# Patient Record
Sex: FEMALE | Race: WHITE | NOT HISPANIC OR LATINO | Employment: OTHER | URBAN - METROPOLITAN AREA
[De-identification: names, ages, dates, MRNs, and addresses within clinical notes are randomized per-mention and may not be internally consistent; named-entity substitution may affect disease eponyms.]

---

## 2019-04-01 ENCOUNTER — APPOINTMENT (OUTPATIENT)
Dept: RADIOLOGY | Facility: MEDICAL CENTER | Age: 64
End: 2019-04-01
Attending: EMERGENCY MEDICINE
Payer: COMMERCIAL

## 2019-04-01 ENCOUNTER — HOSPITAL ENCOUNTER (EMERGENCY)
Facility: MEDICAL CENTER | Age: 64
End: 2019-04-01
Attending: EMERGENCY MEDICINE
Payer: COMMERCIAL

## 2019-04-01 VITALS
OXYGEN SATURATION: 98 % | WEIGHT: 181.88 LBS | TEMPERATURE: 97.9 F | DIASTOLIC BLOOD PRESSURE: 99 MMHG | RESPIRATION RATE: 18 BRPM | SYSTOLIC BLOOD PRESSURE: 164 MMHG | HEART RATE: 73 BPM

## 2019-04-01 DIAGNOSIS — I82.4Z1 ACUTE DEEP VEIN THROMBOSIS (DVT) OF DISTAL VEIN OF RIGHT LOWER EXTREMITY (HCC): ICD-10-CM

## 2019-04-01 LAB
ALBUMIN SERPL BCP-MCNC: 3.6 G/DL (ref 3.2–4.9)
ALBUMIN/GLOB SERPL: 1.2 G/DL
ALP SERPL-CCNC: 73 U/L (ref 30–99)
ALT SERPL-CCNC: 31 U/L (ref 2–50)
ANION GAP SERPL CALC-SCNC: 11 MMOL/L (ref 0–11.9)
APTT PPP: 30.8 SEC (ref 24.7–36)
AST SERPL-CCNC: 26 U/L (ref 12–45)
BASOPHILS # BLD AUTO: 0.6 % (ref 0–1.8)
BASOPHILS # BLD: 0.04 K/UL (ref 0–0.12)
BILIRUB SERPL-MCNC: 0.4 MG/DL (ref 0.1–1.5)
BUN SERPL-MCNC: 12 MG/DL (ref 8–22)
CALCIUM SERPL-MCNC: 9.7 MG/DL (ref 8.4–10.2)
CHLORIDE SERPL-SCNC: 106 MMOL/L (ref 96–112)
CO2 SERPL-SCNC: 21 MMOL/L (ref 20–33)
CREAT SERPL-MCNC: 0.86 MG/DL (ref 0.5–1.4)
EOSINOPHIL # BLD AUTO: 0.13 K/UL (ref 0–0.51)
EOSINOPHIL NFR BLD: 1.9 % (ref 0–6.9)
ERYTHROCYTE [DISTWIDTH] IN BLOOD BY AUTOMATED COUNT: 43.4 FL (ref 35.9–50)
GLOBULIN SER CALC-MCNC: 2.9 G/DL (ref 1.9–3.5)
GLUCOSE SERPL-MCNC: 107 MG/DL (ref 65–99)
HCT VFR BLD AUTO: 41.2 % (ref 37–47)
HGB BLD-MCNC: 13.9 G/DL (ref 12–16)
IMM GRANULOCYTES # BLD AUTO: 0.03 K/UL (ref 0–0.11)
IMM GRANULOCYTES NFR BLD AUTO: 0.4 % (ref 0–0.9)
INR PPP: 1.01 (ref 0.87–1.13)
LYMPHOCYTES # BLD AUTO: 2.17 K/UL (ref 1–4.8)
LYMPHOCYTES NFR BLD: 31.5 % (ref 22–41)
MCH RBC QN AUTO: 32.5 PG (ref 27–33)
MCHC RBC AUTO-ENTMCNC: 33.7 G/DL (ref 33.6–35)
MCV RBC AUTO: 96.3 FL (ref 81.4–97.8)
MONOCYTES # BLD AUTO: 0.48 K/UL (ref 0–0.85)
MONOCYTES NFR BLD AUTO: 7 % (ref 0–13.4)
NEUTROPHILS # BLD AUTO: 4.04 K/UL (ref 2–7.15)
NEUTROPHILS NFR BLD: 58.6 % (ref 44–72)
NRBC # BLD AUTO: 0 K/UL
NRBC BLD-RTO: 0 /100 WBC
PLATELET # BLD AUTO: 188 K/UL (ref 164–446)
PMV BLD AUTO: 11 FL (ref 9–12.9)
POTASSIUM SERPL-SCNC: 3.6 MMOL/L (ref 3.6–5.5)
PROT SERPL-MCNC: 6.5 G/DL (ref 6–8.2)
PROTHROMBIN TIME: 13.2 SEC (ref 12–14.6)
RBC # BLD AUTO: 4.28 M/UL (ref 4.2–5.4)
SODIUM SERPL-SCNC: 138 MMOL/L (ref 135–145)
WBC # BLD AUTO: 6.9 K/UL (ref 4.8–10.8)

## 2019-04-01 PROCEDURE — 85610 PROTHROMBIN TIME: CPT

## 2019-04-01 PROCEDURE — A9270 NON-COVERED ITEM OR SERVICE: HCPCS | Performed by: EMERGENCY MEDICINE

## 2019-04-01 PROCEDURE — 700102 HCHG RX REV CODE 250 W/ 637 OVERRIDE(OP): Performed by: EMERGENCY MEDICINE

## 2019-04-01 PROCEDURE — 85730 THROMBOPLASTIN TIME PARTIAL: CPT

## 2019-04-01 PROCEDURE — 93971 EXTREMITY STUDY: CPT | Mod: RT

## 2019-04-01 PROCEDURE — 85025 COMPLETE CBC W/AUTO DIFF WBC: CPT

## 2019-04-01 PROCEDURE — 99284 EMERGENCY DEPT VISIT MOD MDM: CPT

## 2019-04-01 PROCEDURE — 80053 COMPREHEN METABOLIC PANEL: CPT

## 2019-04-01 RX ADMIN — RIVAROXABAN 15 MG: 15 TABLET, FILM COATED ORAL at 17:28

## 2019-04-01 ASSESSMENT — PAIN SCALES - WONG BAKER: WONGBAKER_NUMERICALRESPONSE: HURTS JUST A LITTLE BIT

## 2019-04-01 NOTE — ED PROVIDER NOTES
ED Provider Note    CHIEF COMPLAINT  Chief Complaint   Patient presents with   • Leg Pain     pain to R calf area. started last night       HPI  Ary Matta is a 64 y.o. female who presents for evaluation of right-sided atraumatic calf pain, woke her from sleep acutely early last night she states the pain is been pretty persistent and at times severe since.  No weakness or numbness or tingling, no fever.  Denies a history of DVT or PE, she denies any sort of hormone replacement, no recent immobilizations or surgeries, no known cancer.  The patient has a family history of PE but states this cases were secondary to trauma.  She otherwise states she is healthy really has no medical problems and has no other complaints at this time.    REVIEW OF SYSTEMS  Negative for fever, rash, chest pain, dyspnea.    PAST MEDICAL HISTORY       SOCIAL HISTORY  Social History     Social History Main Topics   • Smoking status: Never Smoker   • Smokeless tobacco: Never Used   • Alcohol use Yes      Comment: wine daily   • Drug use: No   • Sexual activity: Not on file       SURGICAL HISTORY  patient denies any surgical history    CURRENT MEDICATIONS  I personally reviewed the medication list in the charting documentation.     ALLERGIES  Allergies   Allergen Reactions   • Macrodantin [Kdc:Red Dye+Yellow Dye+Ci Pigment Blue 63+Nitrofurantoin] Shortness of Breath       PHYSICAL EXAM  VITAL SIGNS: BP (!) 199/99   Pulse 87   Temp 37 °C (98.6 °F) (Temporal)   Resp 18   Wt 82.5 kg (181 lb 14.1 oz)   SpO2 96%   Constitutional: Alert in no apparent distress.  HENT: No signs of trauma.   Eyes: Conjunctiva normal, Non-icteric.   Chest: Normal nonlabored respirations  Skin: No erythema, No rash.   Musculoskeletal: Inspection of unremarkable inspection of the right calf, no obvious asymmetric edema, no erythema, neurovascularly intact distally, she does have some tenderness on palpation of the calf posteriorly.  Normal sensation  distally.  Neurologic: Alert, No focal deficits noted.   Psychiatric: Affect normal, Judgment normal.    DIAGNOSTIC STUDIES / PROCEDURES    Results for orders placed or performed during the hospital encounter of 04/01/19   CBC WITH DIFFERENTIAL   Result Value Ref Range    WBC 6.9 4.8 - 10.8 K/uL    RBC 4.28 4.20 - 5.40 M/uL    Hemoglobin 13.9 12.0 - 16.0 g/dL    Hematocrit 41.2 37.0 - 47.0 %    MCV 96.3 81.4 - 97.8 fL    MCH 32.5 27.0 - 33.0 pg    MCHC 33.7 33.6 - 35.0 g/dL    RDW 43.4 35.9 - 50.0 fL    Platelet Count 188 164 - 446 K/uL    MPV 11.0 9.0 - 12.9 fL    Neutrophils-Polys 58.60 44.00 - 72.00 %    Lymphocytes 31.50 22.00 - 41.00 %    Monocytes 7.00 0.00 - 13.40 %    Eosinophils 1.90 0.00 - 6.90 %    Basophils 0.60 0.00 - 1.80 %    Immature Granulocytes 0.40 0.00 - 0.90 %    Nucleated RBC 0.00 /100 WBC    Neutrophils (Absolute) 4.04 2.00 - 7.15 K/uL    Lymphs (Absolute) 2.17 1.00 - 4.80 K/uL    Monos (Absolute) 0.48 0.00 - 0.85 K/uL    Eos (Absolute) 0.13 0.00 - 0.51 K/uL    Baso (Absolute) 0.04 0.00 - 0.12 K/uL    Immature Granulocytes (abs) 0.03 0.00 - 0.11 K/uL    NRBC (Absolute) 0.00 K/uL   COMP METABOLIC PANEL   Result Value Ref Range    Sodium 138 135 - 145 mmol/L    Potassium 3.6 3.6 - 5.5 mmol/L    Chloride 106 96 - 112 mmol/L    Co2 21 20 - 33 mmol/L    Anion Gap 11.0 0.0 - 11.9    Glucose 107 (H) 65 - 99 mg/dL    Bun 12 8 - 22 mg/dL    Creatinine 0.86 0.50 - 1.40 mg/dL    Calcium 9.7 8.4 - 10.2 mg/dL    AST(SGOT) 26 12 - 45 U/L    ALT(SGPT) 31 2 - 50 U/L    Alkaline Phosphatase 73 30 - 99 U/L    Total Bilirubin 0.4 0.1 - 1.5 mg/dL    Albumin 3.6 3.2 - 4.9 g/dL    Total Protein 6.5 6.0 - 8.2 g/dL    Globulin 2.9 1.9 - 3.5 g/dL    A-G Ratio 1.2 g/dL   APTT   Result Value Ref Range    APTT 30.8 24.7 - 36.0 sec   PROTHROMBIN TIME   Result Value Ref Range    PT 13.2 12.0 - 14.6 sec    INR 1.01 0.87 - 1.13   ESTIMATED GFR   Result Value Ref Range    GFR If African American >60 >60 mL/min/1.73 m 2    GFR  If Non African American >60 >60 mL/min/1.73 m 2        RADIOLOGY     US-EXTREMITY VENOUS LOWER UNILAT RIGHT   Final Result      Positive for DVT    COURSE & MEDICAL DECISION MAKING  Pertinent Labs & Imaging studies reviewed. (See chart for details)    Encounter Summary: This is a 64 y.o. female with atraumatic right calf pain, no obvious swelling or erythema on exam but does have some tenderness, neurovascularly intact, will obtain a duplex to rule out DVT.  -------- duplex is positive, patient was started on Xarelto, she will follow with anticoagulation clinic, strict return instructions have been discussed      DISPOSITION: Discharge Home      FINAL IMPRESSION  1. Acute deep vein thrombosis (DVT) of distal vein of right lower extremity (HCC)        This dictation was created using voice recognition software. The accuracy of the dictation is limited to the abilities of the software. I expect there may be some errors of grammar and possibly content. The nursing notes were reviewed and certain aspects of this information were incorporated into this note.    Electronically signed by: Bryce Vance, 4/1/2019 3:39 PM

## 2019-04-01 NOTE — ED TRIAGE NOTES
Chief Complaint   Patient presents with   • Leg Pain     pain to R calf area. started last night    Family hx of PE  BP (!) 199/99   Pulse 87   Temp 37 °C (98.6 °F) (Temporal)   Resp 18   Wt 82.5 kg (181 lb 14.1 oz)   SpO2 96%

## 2019-04-01 NOTE — ED NOTES
Med rec updated and complete  Allergies reviewed  Interviewed pt with friend at bedside with permission from pt.  Pt reports no prescription medications or vitamins.  Pt reports no antibiotics in the last 30 days.  Pt is not sure what pharmacy to go too, not sure what her insurance company will accept.

## 2019-04-02 NOTE — ED NOTES
Discharge instructions provided.  Pt verbalized the understanding of discharge instructions to follow up with PCP and to return to ER if condition worsens.  Pt ambulated out of ER without difficulty. Xarelto starter pack given with instructions.

## 2019-04-08 ENCOUNTER — ANTICOAGULATION VISIT (OUTPATIENT)
Dept: MEDICAL GROUP | Facility: MEDICAL CENTER | Age: 64
End: 2019-04-08
Payer: COMMERCIAL

## 2019-04-08 VITALS — HEART RATE: 91 BPM | DIASTOLIC BLOOD PRESSURE: 91 MMHG | SYSTOLIC BLOOD PRESSURE: 153 MMHG

## 2019-04-08 DIAGNOSIS — I82.4Z1 ACUTE DEEP VEIN THROMBOSIS (DVT) OF DISTAL VEIN OF RIGHT LOWER EXTREMITY (HCC): ICD-10-CM

## 2019-04-08 DIAGNOSIS — Z79.01 LONG TERM (CURRENT) USE OF ANTICOAGULANTS: Primary | ICD-10-CM

## 2019-04-08 PROBLEM — I82.409 DEEP VEIN THROMBOSIS (HCC): Status: ACTIVE | Noted: 2019-04-08

## 2019-04-08 PROCEDURE — 99211 OFF/OP EST MAY X REQ PHY/QHP: CPT | Performed by: FAMILY MEDICINE

## 2019-04-08 NOTE — PROGRESS NOTES
"  OP Anticoagulation Service Note    Date: 2019    Anticoaguation   Pt is  new to our clinic for diagnosis of right lower extremity DVT \"Echolucent material fills the soleal vein from mid to distal calf that has the appearance of acute, occlusive deep venous thrombosis.\"   Duration of treatment TBD   HAS-BLED = 0   Pts mother  from PE after hip injury, her great grandfather  from blood clot after car accident, she denies extended travel, denies tobacco use, denies estrogen    Bleeding Risk Assessment     Denies Epistaxis   Pt denies any excessive or unusual bleeding/hematomas.     Pt denies any GI bleeds or hematemesis.     Pt denies any concerning daily headache or sub dural hematoma symptoms.   Pt denies any hematuria or abnormal vaginal bleeding.   Latest Hemoglobin 13.9   ETOH overuse Denies     Creatinine Clearance/Renal Function     Latest ClCr >60 ml/min     Drug Interactions   ASA/other antiplatelets none   NSAID none   Other drug interactions  noen   No anticonvulsant, azoles, major inducers/inhibitors    Examination   Blood Pressure Elevated 150/80, pt reports has been since her     Symptomatic hypotension NONE   Significant gait impairment/imbalance/high risk for falls? NONE   Avoidance of estrogen containing medications   Discussed need for possible workup for occult malignancy, encouraged pt to get PCP    Final Assessment and Recommendations:   Provided pt education on NOAC. Including how to take, what to do if missed dose. Patient is aware to avoid NSAIDs and ASA (unless directed by provider). Educated pt on s/s of bleeding and thrombosis. Patient is aware to seek medical attention for severe falls or injury to their heads, to report all medication changes to our office and to notify our office of unusual bleeding or bruising.   Medication reviewed and updated  Denies hx of bleeding  Pt counseled to avoid pregnancy  Pt counseled to avoid estrogen  Pt does not use tobacco  Pt does " not have PCP, encouraged her to establish with PCP for workup for possible occult malignanc and they will make final decision on LOT,sudden.       Medication:     Xarelto 15 mg twice daily with food for 21 days then 20 mg once daily for remainder of treatment.     DO NOT STOP anticoagulation unless directed by provider or our clinic.     Follow up 1 month    Stephani Bell, Pharm D

## 2019-04-10 ENCOUNTER — TELEPHONE (OUTPATIENT)
Dept: VASCULAR LAB | Facility: MEDICAL CENTER | Age: 64
End: 2019-04-10

## 2019-04-10 NOTE — TELEPHONE ENCOUNTER
Initial anticoagulation clinic note and most recent ED note reviewed  Patient started on anticoagulation with Xarelto for below-knee DVT, appears unprovoked  She does have a family history of provoked DVT and PE  Patient currently does not have a PCP    Will continue with at least 3 months of oral anticoagulation after which time we can discuss risks and benefits of extended anticoagulation and/or any further workup  Will ask schedulers to make patient a vascular medicine appointment in early July to assess L OT Michael J. Bloch, MD  [facility]

## 2019-05-06 ENCOUNTER — ANTICOAGULATION VISIT (OUTPATIENT)
Dept: MEDICAL GROUP | Facility: MEDICAL CENTER | Age: 64
End: 2019-05-06
Payer: COMMERCIAL

## 2019-05-06 VITALS — DIASTOLIC BLOOD PRESSURE: 88 MMHG | SYSTOLIC BLOOD PRESSURE: 130 MMHG

## 2019-05-06 DIAGNOSIS — Z79.01 LONG TERM (CURRENT) USE OF ANTICOAGULANTS: Primary | ICD-10-CM

## 2019-05-06 PROCEDURE — 93793 ANTICOAG MGMT PT WARFARIN: CPT | Performed by: FAMILY MEDICINE

## 2019-05-06 RX ORDER — LISINOPRIL 20 MG/1
30 TABLET ORAL DAILY
COMMUNITY
End: 2019-07-23

## 2019-05-06 RX ORDER — CITALOPRAM HYDROBROMIDE 10 MG/1
10 TABLET ORAL DAILY
COMMUNITY
End: 2019-07-08

## 2019-05-06 NOTE — PROGRESS NOTES
OP Anticoagulation Service Note       Target end date:TBD     Indication: DVT     Drug: Xarelto 20 mg once daily         Health Status Since Last Assessment   Patient denies any new relevant medical problems, ED visits or hospitalizations   Patient denies any embolic events (stroke/tia/systemic embolism)   She established with a PCP Dr. Smith, she was started on lisinopril and celexa.     Adherence with DOAC Therapy   Pt  has 0 missed any doses in the average week   Pt understand importance of not missing doses of DOAC and increased risk of thrombosis with frequent missed doses     Bleeding Risk Assessment     Denies Epistaxis   Pt denies any excessive or unusual bleeding/hematomas.    Pt denies any GI bleeds or hematemesis.     Pt denies any concerning daily headache or sub dural hematoma symptoms.   Pt denies any hematuria or abnormal vaginal bleeding.   Latest Hemoglobin 13.9   ETOH overuse denies     Creatinine Clearance/Renal Function     Latest ClCr >60     Lab Results   Component Value Date    SODIUM 138 04/01/2019    POTASSIUM 3.6 04/01/2019    CHLORIDE 106 04/01/2019    CO2 21 04/01/2019    ANION 11.0 04/01/2019    GLUCOSE 107 (H) 04/01/2019    BUN 12 04/01/2019    CREATININE 0.86 04/01/2019    CALCIUM 9.7 04/01/2019    ASTSGOT 26 04/01/2019    ALTSGPT 31 04/01/2019    ALKPHOSPHAT 73 04/01/2019    TBILIRUBIN 0.4 04/01/2019    ALBUMIN 3.6 04/01/2019    AGRATIO 1.2 04/01/2019        Drug Interactions   Platelets: none   ASA/other antiplatelets none   NSAID none   Other drug interactions none    Verified no anticonvulsant or azole therapy, education provided for future use.     Examination    There were no vitals filed for this visit.   Symptomatic hypotension none   Significant gait impairment/imbalance/high risk for falls? none   Any falls or accidents since last visit: none     Final Assessment and Recommendations:   Patient appears stable from the anticoagulation staindpoint.    Medication is  affordable- yes with coupon card. Pt has not picked up yet. Has been using samples   Reviewed with pt major s/s of bleeding and to seek immediate medical attention  for any bad falls, accidents or if they hit his head    Benefits of continued DOAC therapy outweigh risks for this patient   Recommend pt continue with current DOAC therapy    Reminded pt not to stop anticoagulation with out speaking with a medical   Other Actions: follow up with Dr. Aldrich in July for lOT    Follow up:   Will follow up with patient in 2 month with Dr. Valdemar Bell, PharmD

## 2019-05-22 ENCOUNTER — TELEPHONE (OUTPATIENT)
Dept: VASCULAR LAB | Facility: MEDICAL CENTER | Age: 64
End: 2019-05-22

## 2019-05-30 ENCOUNTER — HOSPITAL ENCOUNTER (OUTPATIENT)
Dept: RADIOLOGY | Facility: MEDICAL CENTER | Age: 64
End: 2019-05-30
Attending: FAMILY MEDICINE
Payer: COMMERCIAL

## 2019-05-30 DIAGNOSIS — R06.02 SHORTNESS OF BREATH: ICD-10-CM

## 2019-05-30 DIAGNOSIS — R06.00 DYSPNEA, PAROXYSMAL NOCTURNAL: ICD-10-CM

## 2019-05-30 PROCEDURE — 71275 CT ANGIOGRAPHY CHEST: CPT

## 2019-05-30 PROCEDURE — 700117 HCHG RX CONTRAST REV CODE 255: Performed by: FAMILY MEDICINE

## 2019-05-30 PROCEDURE — 71046 X-RAY EXAM CHEST 2 VIEWS: CPT

## 2019-05-30 RX ADMIN — IOHEXOL 75 ML: 350 INJECTION, SOLUTION INTRAVENOUS at 10:09

## 2019-06-03 ENCOUNTER — HOSPITAL ENCOUNTER (OUTPATIENT)
Dept: RADIOLOGY | Facility: MEDICAL CENTER | Age: 64
End: 2019-06-03
Attending: FAMILY MEDICINE
Payer: COMMERCIAL

## 2019-06-03 DIAGNOSIS — Z12.31 ENCOUNTER FOR SCREENING MAMMOGRAM FOR MALIGNANT NEOPLASM OF BREAST: ICD-10-CM

## 2019-06-03 PROCEDURE — 77063 BREAST TOMOSYNTHESIS BI: CPT

## 2019-07-08 ENCOUNTER — OFFICE VISIT (OUTPATIENT)
Dept: VASCULAR LAB | Facility: MEDICAL CENTER | Age: 64
End: 2019-07-08
Attending: FAMILY MEDICINE
Payer: COMMERCIAL

## 2019-07-08 VITALS
HEART RATE: 89 BPM | BODY MASS INDEX: 31.07 KG/M2 | DIASTOLIC BLOOD PRESSURE: 97 MMHG | HEIGHT: 64 IN | WEIGHT: 182 LBS | SYSTOLIC BLOOD PRESSURE: 179 MMHG

## 2019-07-08 DIAGNOSIS — E78.5 DYSLIPIDEMIA: ICD-10-CM

## 2019-07-08 DIAGNOSIS — Z86.718 HISTORY OF DVT (DEEP VEIN THROMBOSIS): ICD-10-CM

## 2019-07-08 DIAGNOSIS — I10 ESSENTIAL HYPERTENSION: ICD-10-CM

## 2019-07-08 DIAGNOSIS — Z79.01 LONG TERM (CURRENT) USE OF ANTICOAGULANTS: ICD-10-CM

## 2019-07-08 PROCEDURE — 99212 OFFICE O/P EST SF 10 MIN: CPT | Performed by: FAMILY MEDICINE

## 2019-07-08 PROCEDURE — 99204 OFFICE O/P NEW MOD 45 MIN: CPT | Performed by: FAMILY MEDICINE

## 2019-07-08 RX ORDER — AMLODIPINE BESYLATE 5 MG/1
5 TABLET ORAL DAILY
Qty: 90 TAB | Refills: 3 | Status: SHIPPED | OUTPATIENT
Start: 2019-07-08 | End: 2019-08-09

## 2019-07-08 RX ORDER — LISINOPRIL 30 MG/1
TABLET ORAL
COMMUNITY
Start: 2019-07-05 | End: 2019-07-08

## 2019-07-08 RX ORDER — ROSUVASTATIN CALCIUM 20 MG/1
20 TABLET, COATED ORAL
Refills: 2 | COMMUNITY
Start: 2019-06-07

## 2019-07-08 ASSESSMENT — ENCOUNTER SYMPTOMS
PALPITATIONS: 0
MYALGIAS: 0
NAUSEA: 0
ABDOMINAL PAIN: 0
HEADACHES: 0
DOUBLE VISION: 0
BRUISES/BLEEDS EASILY: 0
DIARRHEA: 0
FOCAL WEAKNESS: 0
DEPRESSION: 1
ORTHOPNEA: 0
DIZZINESS: 0
WHEEZING: 0
BLURRED VISION: 0
SHORTNESS OF BREATH: 0
BLOOD IN STOOL: 0
COUGH: 0
WEAKNESS: 0
SORE THROAT: 0
CHILLS: 0
SEIZURES: 0
VOMITING: 0
TREMORS: 0
NERVOUS/ANXIOUS: 1
HEMOPTYSIS: 0
FEVER: 0
INSOMNIA: 0

## 2019-07-08 NOTE — PATIENT INSTRUCTIONS
1) stop xarelto today, start aspirin 81mg 2 pills daily   2) tylenol would preferred for pain - try tylenol 8-hour,  Reduce use of anti-inflammatories due to high risk of worsening BP   3) keep BP log   4) get renal artery ultrasound and check labs in 2 weeks in the morning around 8a    Physical activity:  - engage in moderate to vigorous physical activity such as brisk walking, swimming, cycling, >150 minutes per week at 30-40 minutes per session, 3 to 5 times weekly or as directed at today's visit     General healthly nutrition advice:  - the USDA food pattern (https://www.cnpp.usda.gov/USDAFoodPatterns)  - plate method (https://www.Datanyzemyplate.gov/)  - consume diet that emphasizes intake of vegetables, fruits, and whole grains,  - use low fat diary products, poultry, fish, legumes, nontropical vegetable oils, nuts  - limit intake of sweets, sugar-sweetned beverages, and red meats   - reduce saturated and trans fats to <6% of your daily calories   - consume no more than 2,400mg of sodium daily (look at food labels) or if you have high BP then reduce to no more than 1,500mg of sodium daily     BP lowering diet:   - DASH diet (https://www.heart.org/en/health-topics/high-blood-pressure/changes-you-can-make-to-manage-high-blood-pressure/managing-blood-pressure-with-a-heart-healthy-diet)    Cholesterol-reducing diets:   - AHA diet  (http://www.heart.org/en/healthy-living/healthy-eating/eat-smart/nutrition-basics/aha-diet-and-lifestyle-recommendations)   - Mediterranean diet (http://www.heart.org/en/healthy-living/healthy-eating/eat-smart/nutrition-basics/mediterranean-diet)   - lowering triglycerides: (http://my.americanheart.org/idc/groups/ahamah-public/@[facility]/@sop/@[facility]/documents/downloadable/m_425988.pdf)
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
## 2019-07-08 NOTE — NON-PROVIDER
Pt is on statin but does not remember which one.    Apollo Mccoy, Med. [facility]  [facility]

## 2019-07-09 ENCOUNTER — HOSPITAL ENCOUNTER (OUTPATIENT)
Dept: RADIOLOGY | Facility: MEDICAL CENTER | Age: 64
End: 2019-07-09
Attending: FAMILY MEDICINE
Payer: COMMERCIAL

## 2019-07-09 DIAGNOSIS — I10 ESSENTIAL HYPERTENSION, MALIGNANT: ICD-10-CM

## 2019-07-09 PROCEDURE — 93975 VASCULAR STUDY: CPT

## 2019-07-09 PROCEDURE — 93975 VASCULAR STUDY: CPT | Mod: 26 | Performed by: INTERNAL MEDICINE

## 2019-07-23 ENCOUNTER — OFFICE VISIT (OUTPATIENT)
Dept: CARDIOLOGY | Facility: MEDICAL CENTER | Age: 64
End: 2019-07-23
Payer: COMMERCIAL

## 2019-07-23 VITALS
BODY MASS INDEX: 31.41 KG/M2 | WEIGHT: 184 LBS | DIASTOLIC BLOOD PRESSURE: 92 MMHG | SYSTOLIC BLOOD PRESSURE: 170 MMHG | HEIGHT: 64 IN | HEART RATE: 92 BPM

## 2019-07-23 DIAGNOSIS — I10 ESSENTIAL HYPERTENSION: ICD-10-CM

## 2019-07-23 DIAGNOSIS — R06.09 DYSPNEA ON EFFORT: ICD-10-CM

## 2019-07-23 DIAGNOSIS — E78.00 PURE HYPERCHOLESTEROLEMIA: ICD-10-CM

## 2019-07-23 DIAGNOSIS — R07.89 OTHER CHEST PAIN: ICD-10-CM

## 2019-07-23 LAB — EKG IMPRESSION: NORMAL

## 2019-07-23 PROCEDURE — 93000 ELECTROCARDIOGRAM COMPLETE: CPT | Performed by: INTERNAL MEDICINE

## 2019-07-23 PROCEDURE — 99204 OFFICE O/P NEW MOD 45 MIN: CPT | Performed by: INTERNAL MEDICINE

## 2019-07-23 RX ORDER — OLMESARTAN MEDOXOMIL AND HYDROCHLOROTHIAZIDE 40/12.5 40; 12.5 MG/1; MG/1
1 TABLET ORAL DAILY
Qty: 30 TAB | Refills: 11 | Status: SHIPPED | OUTPATIENT
Start: 2019-07-23

## 2019-07-23 ASSESSMENT — ENCOUNTER SYMPTOMS
MYALGIAS: 0
HEARTBURN: 0
PND: 0
CHILLS: 0
HEADACHES: 0
NERVOUS/ANXIOUS: 0
BLURRED VISION: 0
EYE DISCHARGE: 0
DIZZINESS: 0
COUGH: 0
DEPRESSION: 1
SHORTNESS OF BREATH: 1
FEVER: 0
PALPITATIONS: 0
BRUISES/BLEEDS EASILY: 0
NAUSEA: 0
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
## 2019-08-05 ENCOUNTER — NON-PROVIDER VISIT (OUTPATIENT)
Dept: CARDIOLOGY | Facility: MEDICAL CENTER | Age: 64
End: 2019-08-05
Attending: INTERNAL MEDICINE
Payer: COMMERCIAL

## 2019-08-05 ENCOUNTER — TELEPHONE (OUTPATIENT)
Dept: CARDIOLOGY | Facility: MEDICAL CENTER | Age: 64
End: 2019-08-05

## 2019-08-05 VITALS
OXYGEN SATURATION: 97 % | HEIGHT: 64 IN | BODY MASS INDEX: 31.41 KG/M2 | SYSTOLIC BLOOD PRESSURE: 118 MMHG | HEART RATE: 97 BPM | DIASTOLIC BLOOD PRESSURE: 78 MMHG | WEIGHT: 184 LBS

## 2019-08-05 DIAGNOSIS — R07.89 OTHER CHEST PAIN: ICD-10-CM

## 2019-08-05 DIAGNOSIS — R06.09 DYSPNEA ON EFFORT: ICD-10-CM

## 2019-08-05 LAB — TREADMILL STRESS: NORMAL

## 2019-08-05 PROCEDURE — 93015 CV STRESS TEST SUPVJ I&R: CPT | Performed by: INTERNAL MEDICINE

## 2019-08-05 NOTE — TELEPHONE ENCOUNTER
Received call from [facility] at [facility] wondering if pt can be cleared for a colonoscopy scheduled for 8/23/19. Informed her that echo and stress test were ordered at last appt on 7/23/19, so we would probably need to wait for results. Stress test is today 8/5 and echo is 8/12/19. F/u appt with AB on 8/20/19. We will wait for test results and f/u appt for colonoscopy clearance. Added to appt notes.

## 2019-08-09 ENCOUNTER — OFFICE VISIT (OUTPATIENT)
Dept: VASCULAR LAB | Facility: MEDICAL CENTER | Age: 64
End: 2019-08-09
Attending: FAMILY MEDICINE
Payer: COMMERCIAL

## 2019-08-09 VITALS
HEIGHT: 64 IN | WEIGHT: 184.4 LBS | HEART RATE: 98 BPM | BODY MASS INDEX: 31.48 KG/M2 | SYSTOLIC BLOOD PRESSURE: 125 MMHG | DIASTOLIC BLOOD PRESSURE: 83 MMHG

## 2019-08-09 DIAGNOSIS — E78.5 DYSLIPIDEMIA: ICD-10-CM

## 2019-08-09 DIAGNOSIS — I10 ESSENTIAL HYPERTENSION: ICD-10-CM

## 2019-08-09 DIAGNOSIS — Z86.718 HISTORY OF DVT (DEEP VEIN THROMBOSIS): ICD-10-CM

## 2019-08-09 DIAGNOSIS — F43.9 STRESS: ICD-10-CM

## 2019-08-09 DIAGNOSIS — T46.4X5A COUGH DUE TO ACE INHIBITOR: ICD-10-CM

## 2019-08-09 DIAGNOSIS — R05.8 COUGH DUE TO ACE INHIBITOR: ICD-10-CM

## 2019-08-09 DIAGNOSIS — F51.01 PRIMARY INSOMNIA: ICD-10-CM

## 2019-08-09 PROCEDURE — 99212 OFFICE O/P EST SF 10 MIN: CPT | Performed by: FAMILY MEDICINE

## 2019-08-09 PROCEDURE — 99214 OFFICE O/P EST MOD 30 MIN: CPT | Performed by: FAMILY MEDICINE

## 2019-08-09 ASSESSMENT — ENCOUNTER SYMPTOMS
COUGH: 0
PALPITATIONS: 0
DOUBLE VISION: 0
SEIZURES: 0
NAUSEA: 0
DEPRESSION: 1
TREMORS: 0
DIARRHEA: 0
ABDOMINAL PAIN: 0
ORTHOPNEA: 0
FEVER: 0
INSOMNIA: 0
NERVOUS/ANXIOUS: 1
DIZZINESS: 0
SHORTNESS OF BREATH: 0
VOMITING: 0
HEMOPTYSIS: 0
WHEEZING: 0
SORE THROAT: 0
HEADACHES: 0
BRUISES/BLEEDS EASILY: 0
BLOOD IN STOOL: 0
BLURRED VISION: 0
WEAKNESS: 0
CHILLS: 0
MYALGIAS: 0
FOCAL WEAKNESS: 0

## 2019-08-09 NOTE — PATIENT INSTRUCTIONS
) continue all medications  2) check labs when fasting in about 6-8 weeks  3) stress reduction, talk to PCP about insomnia treatment and possibly Dr. Marissa Perales evaluation     Physical activity:  - engage in moderate to vigorous physical activity such as brisk walking, swimming, cycling, >150 minutes per week at 30-40 minutes per session, 3 to 5 times weekly or as directed at today's visit     General healthly nutrition advice:  - the USDA food pattern (https://www.cnpp.usda.gov/USDAFoodPatterns)  - plate method (https://www.choosemyplate.gov/)  - consume diet that emphasizes intake of vegetables, fruits, and whole grains,  - use low fat diary products, poultry, fish, legumes, nontropical vegetable oils, nuts  - limit intake of sweets, sugar-sweetned beverages, and red meats   - reduce saturated and trans fats to <6% of your daily calories   - consume no more than 2,400mg of sodium daily (look at food labels) or if you have high BP then reduce to no more than 1,500mg of sodium daily     BP lowering diet:   - DASH diet (https://www.heart.org/en/health-topics/high-blood-pressure/changes-you-can-make-to-manage-high-blood-pressure/managing-blood-pressure-with-a-heart-healthy-diet)    Cholesterol-reducing diets:   - AHA diet  (http://www.heart.org/en/healthy-living/healthy-eating/eat-smart/nutrition-basics/aha-diet-and-lifestyle-recommendations)   - Mediterranean diet (http://www.heart.org/en/healthy-living/healthy-eating/eat-smart/nutrition-basics/mediterranean-diet)   - lowering triglycerides: (http://my.americanheart.org/idc/groups/ahamah-public/@wc/@sop/@[facility]/documents/downloadable/[facility]_425988.pdf)
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
## 2019-08-12 ENCOUNTER — HOSPITAL ENCOUNTER (OUTPATIENT)
Dept: CARDIOLOGY | Facility: MEDICAL CENTER | Age: 64
End: 2019-08-12
Attending: INTERNAL MEDICINE
Payer: COMMERCIAL

## 2019-08-12 DIAGNOSIS — R06.09 DYSPNEA ON EFFORT: ICD-10-CM

## 2019-08-12 DIAGNOSIS — R07.89 OTHER CHEST PAIN: ICD-10-CM

## 2019-08-12 LAB
LV EJECT FRACT  99904: 65
LV EJECT FRACT MOD 2C 99903: 70.4
LV EJECT FRACT MOD 4C 99902: 63.29
LV EJECT FRACT MOD BP 99901: 66.63

## 2019-08-12 PROCEDURE — 93306 TTE W/DOPPLER COMPLETE: CPT

## 2019-08-12 PROCEDURE — 93306 TTE W/DOPPLER COMPLETE: CPT | Mod: 26 | Performed by: INTERNAL MEDICINE

## 2019-10-15 ENCOUNTER — HOSPITAL ENCOUNTER (OUTPATIENT)
Dept: RADIOLOGY | Facility: MEDICAL CENTER | Age: 64
End: 2019-10-15
Attending: OBSTETRICS & GYNECOLOGY
Payer: COMMERCIAL

## 2019-10-15 DIAGNOSIS — N95.0 POSTMENOPAUSAL BLEEDING: ICD-10-CM

## 2019-10-15 PROCEDURE — 76830 TRANSVAGINAL US NON-OB: CPT

## 2019-11-15 ENCOUNTER — OFFICE VISIT (OUTPATIENT)
Dept: VASCULAR LAB | Facility: MEDICAL CENTER | Age: 64
End: 2019-11-15
Attending: INTERNAL MEDICINE
Payer: COMMERCIAL

## 2019-11-15 VITALS
BODY MASS INDEX: 33.12 KG/M2 | HEART RATE: 93 BPM | HEIGHT: 64 IN | DIASTOLIC BLOOD PRESSURE: 79 MMHG | WEIGHT: 194 LBS | SYSTOLIC BLOOD PRESSURE: 150 MMHG

## 2019-11-15 DIAGNOSIS — I82.401 ACUTE DEEP VEIN THROMBOSIS (DVT) OF RIGHT LOWER EXTREMITY, UNSPECIFIED VEIN (HCC): ICD-10-CM

## 2019-11-15 DIAGNOSIS — E78.5 DYSLIPIDEMIA: ICD-10-CM

## 2019-11-15 DIAGNOSIS — Z79.01 LONG TERM (CURRENT) USE OF ANTICOAGULANTS: ICD-10-CM

## 2019-11-15 DIAGNOSIS — I10 ESSENTIAL HYPERTENSION: ICD-10-CM

## 2019-11-15 PROCEDURE — 99214 OFFICE O/P EST MOD 30 MIN: CPT | Performed by: NURSE PRACTITIONER

## 2019-11-15 PROCEDURE — 99212 OFFICE O/P EST SF 10 MIN: CPT | Performed by: NURSE PRACTITIONER

## 2019-11-15 ASSESSMENT — ENCOUNTER SYMPTOMS
BLOOD IN STOOL: 0
ABDOMINAL PAIN: 0
MYALGIAS: 0
DEPRESSION: 1
HEMOPTYSIS: 0
INSOMNIA: 0
DIARRHEA: 0
PALPITATIONS: 0
SORE THROAT: 0
DIZZINESS: 0
NAUSEA: 0
SEIZURES: 0
ORTHOPNEA: 0
HEADACHES: 0
FOCAL WEAKNESS: 0
BLURRED VISION: 0
CHILLS: 0
VOMITING: 0
FEVER: 0
COUGH: 0
DOUBLE VISION: 0
WHEEZING: 0
SHORTNESS OF BREATH: 0
WEAKNESS: 0
NERVOUS/ANXIOUS: 1
BRUISES/BLEEDS EASILY: 0
TREMORS: 0
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
## 2020-03-18 ENCOUNTER — ANTICOAGULATION MONITORING (OUTPATIENT)
Dept: VASCULAR LAB | Facility: MEDICAL CENTER | Age: 65
End: 2020-03-18

## 2020-03-18 DIAGNOSIS — Z79.01 LONG TERM (CURRENT) USE OF ANTICOAGULANTS: ICD-10-CM

## 2020-03-18 NOTE — PROGRESS NOTES
Discharged from [facility].      Pt has been transitioned to ASA  Sadia Filter, Clinical Pharmacist, CDE, CACP

## 2020-03-30 ENCOUNTER — HOSPITAL ENCOUNTER (OUTPATIENT)
Dept: LAB | Facility: MEDICAL CENTER | Age: 65
End: 2020-03-30
Attending: SPECIALIST
Payer: MEDICARE

## 2020-03-30 PROCEDURE — 87491 CHLMYD TRACH DNA AMP PROBE: CPT

## 2020-03-30 PROCEDURE — 87591 N.GONORRHOEAE DNA AMP PROB: CPT

## 2020-03-30 PROCEDURE — 88175 CYTOPATH C/V AUTO FLUID REDO: CPT

## 2020-03-30 PROCEDURE — 87624 HPV HI-RISK TYP POOLED RSLT: CPT

## 2020-04-01 ENCOUNTER — ANESTHESIA EVENT (OUTPATIENT)
Dept: SURGERY | Facility: MEDICAL CENTER | Age: 65
End: 2020-04-01
Payer: MEDICARE

## 2020-04-01 DIAGNOSIS — Z01.812 PRE-PROCEDURAL LABORATORY EXAMINATION: ICD-10-CM

## 2020-04-01 LAB
C TRACH DNA GENITAL QL NAA+PROBE: NEGATIVE
CYTOLOGY REG CYTOL: NORMAL
HPV HR 12 DNA CVX QL NAA+PROBE: NEGATIVE
HPV16 DNA SPEC QL NAA+PROBE: NEGATIVE
HPV18 DNA SPEC QL NAA+PROBE: NEGATIVE
N GONORRHOEA DNA GENITAL QL NAA+PROBE: NEGATIVE
SPECIMEN SOURCE: NORMAL
SPECIMEN SOURCE: NORMAL

## 2020-04-01 NOTE — H&P
DATE OF SCHEDULED SURGERY:  2020    REASON FOR SURGERY:  Refractory postmenopausal bleeding, pelvic pressure and   pain.    HISTORY OF PRESENT ILLNESS:  This is a 65-year-old postmenopausal woman    2, para 1 who had been kindly sent and referred to our office by Dr. Mei Gonzalez after the patient had been seen and evaluated by another GYN   provider.  In 2019, the patient was seen and evaluated for deep vein   thrombosis, was started on Xarelto, subsequently had postmenopausal bleeding,   was seen and evaluated by Dr. Esther Persaud who she states had undergone a   workup at that time with no obvious source to the postmenopausal bleeding.    The patient had expectantly managed this intermittent postmenopausal bleeding   over the course of a year.  More recently, the patient states that she has had   increasing complaints of bleeding and what she feels is passage of tissue.    She was growing increasingly concerned, was seen and evaluated again by Dr. Esther Persaud, at which time the patient states that a speculum was unable to   be inserted for evaluation given labial agglutination.  The patient did   undergo an ultrasound and was reassured by Dr. Persaud; however, the patient   has had increasing complaints of bleeding and passage of tissue and now   wishes to proceed forward with a second opinion.  Upon arrival to our office,   she was able to have a speculum placed; however, in the course of obtaining an   endometrial biopsy in the office, the patient was uncomfortable and unable to   tolerate the procedure and has now requested to proceed forward with a   diagnostic hysteroscopy, dilatation and curettage, endocervical curettage   under anesthesia.  Risks, benefits and alternatives have been addressed.  She   has asked appropriate questions, signed the appropriate consents and wishes to   proceed forward with the above surgery.  She does state that she understands   limitations of surgery  and that should endometrial hyperplasia or carcinoma be   found on final pathology.  The patient may need additional medical or   surgical management.  She also understands her pelvic pain, pressure may be   unrelated to her postmenopausal bleeding, may require additional medical or   surgical management for additional workup.  She also has concerns regarding   urinary tract and has been seen and evaluated by Dr. Michael Johnson and is   followed closely.  She is also a S[employer]ism and requires that no blood   be transfused.    PAST MEDICAL HISTORY:  Lumbago, chronic hypertension, deep vein thrombosis,   hypercholesterolemia, gastroesophageal reflux disease.    PAST SURGICAL HISTORY:  She denies any previous surgeries.    OBSTETRICAL HISTORY:  One previous delivery in , 1 spontaneous .    GYNECOLOGIC HISTORY:  The patient as stated above has been intermittently   having postmenopausal bleeding over the last year.  Reports pelvic pain,   dyspareunia, pressure, urinary incontinence, recurrent urinary tract   infections.  She denies any previous sexually transmitted diseases or pelvic   infections.    FAMILY HISTORY:  Remarkable for one of her sisters having colon and cervical   cancer.    REVIEW OF SYSTEMS:  Otherwise unremarkable.    SOCIAL HISTORY:  She is .  She is an .  She reports   3-5 alcoholic drinks per week.  She denies tobacco or other drug use.    MEDICATIONS:  Aspirin 81 mg tablets daily, losartan/hydrochlorothiazide   50/12.5 mg tablets daily, omeprazole 20 mg tablets daily, rosuvastatin 20 mg   tablets daily, vitamin D3 125 mcg tablet daily.    ALLERGIES:  PENICILLIN.    PHYSICAL EXAMINATION:  GENERAL:  She is afebrile, hemodynamically stable.  CURRENT VITAL SIGNS:  Can be seen in electronic medical record.  HEART:  Regular rate and rhythm.  CHEST:  Clear to auscultation bilaterally.  ABDOMEN:  Soft, obese, nontender.  PELVIC:  Exam shows atrophic external  female genitalia, vaginal vault.  She   does have minimal amount of labial agglutination superiorly below the clitoral   tabares.  She has atrophic external female genitalia, vaginal vault with a   narrowed introitus.  Cervix was visualized and found to be free of any obvious   pathology.  A Pap smear is currently pending and was taken.  Bimanual exam   shows tenderness to palpation of the cervix.  On bimanual examination,   tenderness to palpation at the uterine fundus.  No adnexal mass or tenderness   to palpation were appreciated on bimanual examination.  Rectovaginal exam   confirmed the above.  She is guaiac negative.  EXTREMITIES:  Nontender.    Transvaginal pelvic ultrasound shows a uterus measuring 6.1x4.8x2.8 with an   endometrial stripe thickness of 5.8 mm.  There is fluid seen within the   endometrial cavity measuring 3.7 cm.  There is a hypoechoic solid structure   seen impinging on the right lateral side of the endometrial cavity 1.7x1.6x1.3   cm with increasing vascularity seen within the structure.    ASSESSMENT AND PLAN:  A 65-year-old woman with persistent postmenopausal   bleeding over the course of a year, followed by another provider, now with   increasing complaints of pressure, discomfort, spotting and what she feels is   passage of tissue, now electing to proceed forward with a diagnostic   hysteroscopy, dilatation and curettage, possible resection of intrauterine   mass.  Risks, benefits, and alternatives of all individual portions of the   procedure were addressed including limitations of surgery, which she has   scheduled for 04/02/2020.       ____________________________________     MD MERNA CHURCHILL / ALINE    DD:  04/01/2020 09:00:50  DT:  04/01/2020 09:21:16    D#:  [id number]  Job#:  [id number]

## 2020-04-01 NOTE — OR NURSING
COVID-19 Pre-surgery screenin. Do you have an undiagnosed respiratory illness or symptoms such as coughing or sneezing? NO  a. Onset of Sx   b. Acute vs. chronic respiratory illness     2. Do you have an unexplained fever greater than 100.4 degrees Fahrenheit or 38 degrees Celsius?     NO    3. Have you had direct exposure to a patient who tested positive for Covid-19?    NO    4. Have you traveled within the last 14 days out of the country or to [place], [place], [place]?    NO

## 2020-04-02 ENCOUNTER — ANESTHESIA (OUTPATIENT)
Dept: SURGERY | Facility: MEDICAL CENTER | Age: 65
End: 2020-04-02
Payer: MEDICARE

## 2020-04-02 ENCOUNTER — HOSPITAL ENCOUNTER (OUTPATIENT)
Facility: MEDICAL CENTER | Age: 65
End: 2020-04-02
Attending: SPECIALIST | Admitting: SPECIALIST
Payer: MEDICARE

## 2020-04-02 VITALS
DIASTOLIC BLOOD PRESSURE: 62 MMHG | OXYGEN SATURATION: 93 % | SYSTOLIC BLOOD PRESSURE: 120 MMHG | RESPIRATION RATE: 18 BRPM | TEMPERATURE: 97.8 F | HEIGHT: 64 IN | WEIGHT: 198.19 LBS | HEART RATE: 75 BPM | BODY MASS INDEX: 33.84 KG/M2

## 2020-04-02 LAB
ANION GAP SERPL CALC-SCNC: 15 MMOL/L (ref 7–16)
BUN SERPL-MCNC: 21 MG/DL (ref 8–22)
CALCIUM SERPL-MCNC: 10.2 MG/DL (ref 8.5–10.5)
CHLORIDE SERPL-SCNC: 101 MMOL/L (ref 96–112)
CO2 SERPL-SCNC: 22 MMOL/L (ref 20–33)
CREAT SERPL-MCNC: 1.12 MG/DL (ref 0.5–1.4)
EKG IMPRESSION: NORMAL
ERYTHROCYTE [DISTWIDTH] IN BLOOD BY AUTOMATED COUNT: 48.2 FL (ref 35.9–50)
GLUCOSE SERPL-MCNC: 94 MG/DL (ref 65–99)
HCT VFR BLD AUTO: 38.4 % (ref 37–47)
HGB BLD-MCNC: 12.6 G/DL (ref 12–16)
MCH RBC QN AUTO: 33.2 PG (ref 27–33)
MCHC RBC AUTO-ENTMCNC: 32.8 G/DL (ref 33.6–35)
MCV RBC AUTO: 101.3 FL (ref 81.4–97.8)
PATHOLOGY CONSULT NOTE: NORMAL
PLATELET # BLD AUTO: 227 K/UL (ref 164–446)
PMV BLD AUTO: 11.2 FL (ref 9–12.9)
POTASSIUM SERPL-SCNC: 4.8 MMOL/L (ref 3.6–5.5)
RBC # BLD AUTO: 3.79 M/UL (ref 4.2–5.4)
SODIUM SERPL-SCNC: 138 MMOL/L (ref 135–145)
WBC # BLD AUTO: 6.6 K/UL (ref 4.8–10.8)

## 2020-04-02 PROCEDURE — 700105 HCHG RX REV CODE 258: Performed by: SPECIALIST

## 2020-04-02 PROCEDURE — 80048 BASIC METABOLIC PNL TOTAL CA: CPT

## 2020-04-02 PROCEDURE — 160029 HCHG SURGERY MINUTES - 1ST 30 MINS LEVEL 4: Performed by: SPECIALIST

## 2020-04-02 PROCEDURE — 502587 HCHG PACK, D&C: Performed by: SPECIALIST

## 2020-04-02 PROCEDURE — 160048 HCHG OR STATISTICAL LEVEL 1-5: Performed by: SPECIALIST

## 2020-04-02 PROCEDURE — 88305 TISSUE EXAM BY PATHOLOGIST: CPT

## 2020-04-02 PROCEDURE — 160041 HCHG SURGERY MINUTES - EA ADDL 1 MIN LEVEL 4: Performed by: SPECIALIST

## 2020-04-02 PROCEDURE — 160046 HCHG PACU - 1ST 60 MINS PHASE II: Performed by: SPECIALIST

## 2020-04-02 PROCEDURE — 700102 HCHG RX REV CODE 250 W/ 637 OVERRIDE(OP): Performed by: STUDENT IN AN ORGANIZED HEALTH CARE EDUCATION/TRAINING PROGRAM

## 2020-04-02 PROCEDURE — 160009 HCHG ANES TIME/MIN: Performed by: SPECIALIST

## 2020-04-02 PROCEDURE — 93010 ELECTROCARDIOGRAM REPORT: CPT | Performed by: INTERNAL MEDICINE

## 2020-04-02 PROCEDURE — 160036 HCHG PACU - EA ADDL 30 MINS PHASE I: Performed by: SPECIALIST

## 2020-04-02 PROCEDURE — 160002 HCHG RECOVERY MINUTES (STAT): Performed by: SPECIALIST

## 2020-04-02 PROCEDURE — 85027 COMPLETE CBC AUTOMATED: CPT

## 2020-04-02 PROCEDURE — 160035 HCHG PACU - 1ST 60 MINS PHASE I: Performed by: SPECIALIST

## 2020-04-02 PROCEDURE — 501838 HCHG SUTURE GENERAL: Performed by: SPECIALIST

## 2020-04-02 PROCEDURE — 93005 ELECTROCARDIOGRAM TRACING: CPT | Performed by: SPECIALIST

## 2020-04-02 PROCEDURE — 160025 RECOVERY II MINUTES (STATS): Performed by: SPECIALIST

## 2020-04-02 PROCEDURE — A9270 NON-COVERED ITEM OR SERVICE: HCPCS | Performed by: STUDENT IN AN ORGANIZED HEALTH CARE EDUCATION/TRAINING PROGRAM

## 2020-04-02 PROCEDURE — 700101 HCHG RX REV CODE 250: Performed by: STUDENT IN AN ORGANIZED HEALTH CARE EDUCATION/TRAINING PROGRAM

## 2020-04-02 PROCEDURE — 700111 HCHG RX REV CODE 636 W/ 250 OVERRIDE (IP): Performed by: STUDENT IN AN ORGANIZED HEALTH CARE EDUCATION/TRAINING PROGRAM

## 2020-04-02 RX ORDER — HALOPERIDOL 5 MG/ML
1 INJECTION INTRAMUSCULAR
Status: DISCONTINUED | OUTPATIENT
Start: 2020-04-02 | End: 2020-04-02 | Stop reason: HOSPADM

## 2020-04-02 RX ORDER — ONDANSETRON 2 MG/ML
4 INJECTION INTRAMUSCULAR; INTRAVENOUS
Status: DISCONTINUED | OUTPATIENT
Start: 2020-04-02 | End: 2020-04-02 | Stop reason: HOSPADM

## 2020-04-02 RX ORDER — SIMETHICONE 80 MG
80 TABLET,CHEWABLE ORAL EVERY 8 HOURS PRN
Status: DISCONTINUED | OUTPATIENT
Start: 2020-04-02 | End: 2020-04-02 | Stop reason: HOSPADM

## 2020-04-02 RX ORDER — DIPHENHYDRAMINE HYDROCHLORIDE 50 MG/ML
12.5 INJECTION INTRAMUSCULAR; INTRAVENOUS
Status: DISCONTINUED | OUTPATIENT
Start: 2020-04-02 | End: 2020-04-02 | Stop reason: HOSPADM

## 2020-04-02 RX ORDER — LIDOCAINE HYDROCHLORIDE 20 MG/ML
INJECTION, SOLUTION EPIDURAL; INFILTRATION; INTRACAUDAL; PERINEURAL PRN
Status: DISCONTINUED | OUTPATIENT
Start: 2020-04-02 | End: 2020-04-02 | Stop reason: SURG

## 2020-04-02 RX ORDER — SUCCINYLCHOLINE CHLORIDE 20 MG/ML
INJECTION INTRAMUSCULAR; INTRAVENOUS PRN
Status: DISCONTINUED | OUTPATIENT
Start: 2020-04-02 | End: 2020-04-02 | Stop reason: SURG

## 2020-04-02 RX ORDER — ONDANSETRON 2 MG/ML
INJECTION INTRAMUSCULAR; INTRAVENOUS PRN
Status: DISCONTINUED | OUTPATIENT
Start: 2020-04-02 | End: 2020-04-02 | Stop reason: SURG

## 2020-04-02 RX ORDER — HYDRALAZINE HYDROCHLORIDE 20 MG/ML
5 INJECTION INTRAMUSCULAR; INTRAVENOUS
Status: DISCONTINUED | OUTPATIENT
Start: 2020-04-02 | End: 2020-04-02 | Stop reason: HOSPADM

## 2020-04-02 RX ORDER — SODIUM CHLORIDE, SODIUM LACTATE, POTASSIUM CHLORIDE, CALCIUM CHLORIDE 600; 310; 30; 20 MG/100ML; MG/100ML; MG/100ML; MG/100ML
INJECTION, SOLUTION INTRAVENOUS CONTINUOUS
Status: DISCONTINUED | OUTPATIENT
Start: 2020-04-02 | End: 2020-04-02 | Stop reason: HOSPADM

## 2020-04-02 RX ORDER — HYDROMORPHONE HYDROCHLORIDE 1 MG/ML
0.1 INJECTION, SOLUTION INTRAMUSCULAR; INTRAVENOUS; SUBCUTANEOUS
Status: DISCONTINUED | OUTPATIENT
Start: 2020-04-02 | End: 2020-04-02 | Stop reason: HOSPADM

## 2020-04-02 RX ORDER — PROMETHAZINE HYDROCHLORIDE 25 MG/1
12.5 SUPPOSITORY RECTAL EVERY 4 HOURS PRN
Status: DISCONTINUED | OUTPATIENT
Start: 2020-04-02 | End: 2020-04-02 | Stop reason: HOSPADM

## 2020-04-02 RX ORDER — MEPERIDINE HYDROCHLORIDE 25 MG/ML
12.5 INJECTION INTRAMUSCULAR; INTRAVENOUS; SUBCUTANEOUS
Status: DISCONTINUED | OUTPATIENT
Start: 2020-04-02 | End: 2020-04-02 | Stop reason: HOSPADM

## 2020-04-02 RX ORDER — DEXAMETHASONE SODIUM PHOSPHATE 4 MG/ML
INJECTION, SOLUTION INTRA-ARTICULAR; INTRALESIONAL; INTRAMUSCULAR; INTRAVENOUS; SOFT TISSUE PRN
Status: DISCONTINUED | OUTPATIENT
Start: 2020-04-02 | End: 2020-04-02 | Stop reason: SURG

## 2020-04-02 RX ORDER — OXYCODONE HCL 5 MG/5 ML
10 SOLUTION, ORAL ORAL
Status: COMPLETED | OUTPATIENT
Start: 2020-04-02 | End: 2020-04-02

## 2020-04-02 RX ORDER — OXYCODONE HCL 5 MG/5 ML
5 SOLUTION, ORAL ORAL
Status: COMPLETED | OUTPATIENT
Start: 2020-04-02 | End: 2020-04-02

## 2020-04-02 RX ORDER — HYDROMORPHONE HYDROCHLORIDE 1 MG/ML
0.2 INJECTION, SOLUTION INTRAMUSCULAR; INTRAVENOUS; SUBCUTANEOUS
Status: DISCONTINUED | OUTPATIENT
Start: 2020-04-02 | End: 2020-04-02 | Stop reason: HOSPADM

## 2020-04-02 RX ORDER — HYDROMORPHONE HYDROCHLORIDE 1 MG/ML
0.4 INJECTION, SOLUTION INTRAMUSCULAR; INTRAVENOUS; SUBCUTANEOUS
Status: DISCONTINUED | OUTPATIENT
Start: 2020-04-02 | End: 2020-04-02 | Stop reason: HOSPADM

## 2020-04-02 RX ADMIN — PROPOFOL 180 MG: 10 INJECTION, EMULSION INTRAVENOUS at 07:46

## 2020-04-02 RX ADMIN — SODIUM CHLORIDE, POTASSIUM CHLORIDE, SODIUM LACTATE AND CALCIUM CHLORIDE: 600; 310; 30; 20 INJECTION, SOLUTION INTRAVENOUS at 07:40

## 2020-04-02 RX ADMIN — LIDOCAINE HYDROCHLORIDE 80 MG: 20 INJECTION, SOLUTION EPIDURAL; INFILTRATION; INTRACAUDAL at 07:46

## 2020-04-02 RX ADMIN — FENTANYL CITRATE 50 MCG: 50 INJECTION, SOLUTION INTRAMUSCULAR; INTRAVENOUS at 07:40

## 2020-04-02 RX ADMIN — DEXAMETHASONE SODIUM PHOSPHATE 4 MG: 4 INJECTION, SOLUTION INTRA-ARTICULAR; INTRALESIONAL; INTRAMUSCULAR; INTRAVENOUS; SOFT TISSUE at 07:54

## 2020-04-02 RX ADMIN — ONDANSETRON 4 MG: 2 INJECTION INTRAMUSCULAR; INTRAVENOUS at 08:09

## 2020-04-02 RX ADMIN — PROPOFOL 20 MG: 10 INJECTION, EMULSION INTRAVENOUS at 07:43

## 2020-04-02 RX ADMIN — FENTANYL CITRATE 50 MCG: 50 INJECTION, SOLUTION INTRAMUSCULAR; INTRAVENOUS at 07:54

## 2020-04-02 RX ADMIN — OXYCODONE HYDROCHLORIDE 10 MG: 5 SOLUTION ORAL at 08:41

## 2020-04-02 RX ADMIN — SUCCINYLCHOLINE CHLORIDE 80 MG: 20 INJECTION, SOLUTION INTRAMUSCULAR; INTRAVENOUS at 07:46

## 2020-04-02 RX ADMIN — LIDOCAINE HYDROCHLORIDE 20 MG: 20 INJECTION, SOLUTION EPIDURAL; INFILTRATION; INTRACAUDAL at 07:42

## 2020-04-02 RX ADMIN — FENTANYL CITRATE 25 MCG: 0.05 INJECTION, SOLUTION INTRAMUSCULAR; INTRAVENOUS at 08:54

## 2020-04-02 ASSESSMENT — FIBROSIS 4 INDEX: FIB4 SCORE: 1.61

## 2020-04-02 NOTE — ANESTHESIA POSTPROCEDURE EVALUATION
Patient: Ary Matta    Procedure Summary     Date:  04/02/20 Room / Location:  [facility] 12 / SURGERY [facility]    Anesthesia Start:  0740 Anesthesia Stop:  0820    Procedures:       HYSTEROSCOPY, DIAGNOSTIC (Uterus)      DILATION AND CURETTAGE (Uterus) Diagnosis:  (POSTMENOPAUSAL BLEEDING, PELVIC PAIN)    Surgeon:  Camron Longo M.D. Responsible Provider:  Leroy Cordoba M.D.    Anesthesia Type:  general ASA Status:  2          Final Anesthesia Type: general  Last vitals  BP   Blood Pressure : 132/48    Temp   36.5 °C (97.7 °F)    Pulse   Pulse: 77   Resp   12    SpO2   94 %      Anesthesia Post Evaluation    Patient location during evaluation: PACU  Patient participation: complete - patient participated  Level of consciousness: awake and alert    Airway patency: patent  Anesthetic complications: no  Cardiovascular status: hemodynamically stable  Respiratory status: acceptable  Hydration status: euvolemic    PONV: none           Nurse Pain Score: 2 (NPRS)

## 2020-04-02 NOTE — OR NURSING
Pt awake, alert and oriented on RA; tolerating fluids. Pt noted to have minimal serosanguinous drainage on peripad and a small BM.

## 2020-04-02 NOTE — ANESTHESIA PROCEDURE NOTES
Airway  Date/Time: 4/2/2020 7:47 AM  Performed by: Leroy Cordoba M.D.  Authorized by: Leroy Cordoba M.D.     Location:  OR  Urgency:  Elective  Indications for Airway Management:  Anesthesia      Spontaneous Ventilation: absent    Sedation Level:  Deep  Preoxygenated: Yes    Patient Position:  Sniffing  Mask Difficulty Assessment:  0 - not attempted  Final Airway Type:  Endotracheal airway  Final Endotracheal Airway:  ETT  Cuffed: Yes    Technique Used for Successful ETT Placement:  Direct laryngoscopy  Insertion Site:  Oral  Blade Type:  Harini  Laryngoscope Blade/Videolaryngoscope Blade Size:  3  ETT Size (mm):  7.0  Measured from:  Teeth  ETT to Teeth (cm):  22  Placement Verified by: auscultation and capnometry    Cormack-Lehane Classification:  Grade I - full view of glottis  Number of Attempts at Approach:  1

## 2020-04-02 NOTE — ANESTHESIA TIME REPORT
Anesthesia Start and Stop Event Times     Date Time Event    4/2/2020 0737 Ready for Procedure     0740 Anesthesia Start     0820 Anesthesia Stop        Responsible Staff  04/02/20    Name Role Begin End    Leroy Cordoba M.D. Anesth 0740 0820        Preop Diagnosis (Free Text):  Pre-op Diagnosis     POSTMENOPAUSAL BLEEDING, PELVIC PAIN        Preop Diagnosis (Codes):    Post op Diagnosis  Postmenopausal bleeding      Premium Reason  Non-Premium    Comments:

## 2020-04-02 NOTE — ANESTHESIA QCDR
2019 [facility] Clinical Data Registry (for Quality Improvement)     Postoperative nausea/vomiting risk protocol (Adult = 18 yrs and Pediatric 3-17 yrs)- (430 and 463)  General inhalation anesthetic (NOT TIVA) with PONV risk factors: Yes  Provision of anti-emetic therapy with at least 2 different classes of agents: Yes   Patient DID NOT receive anti-emetic therapy and reason is documented in Medical Record:  N/A    Multimodal Pain Management- (477)  Non-emergent surgery AND patient age >= 18: No  Use of Multimodal Pain Management, two or more drugs and/or interventions, NOT including systemic opioids:   Exception: Documented allergy to multiple classes of analgesics:     Smoking Abstinence (404)  Patient is current smoker (cigarette, pipe, e-cig, marijuanna): No  Elective Surgery:   Abstinence instructions provided prior to day of surgery:   Patient abstained from smoking on day of surgery:     Pre-Op Beta-Blocker in Isolated CABG (44)  Isolated CABG AND patient age >= 18: No  Beta-blocker admin within 24 hours of surgical incision:   Exception:of medical reason(s) for not administering beta blocker within 24 hours prior to surgical incision (e.g., not  indicated,other medical reason):     PACU assessment of acute postoperative pain prior to Anesthesia Care End- Applies to Patients Age = 18- (ABG7)  Initial PACU pain score is which of the following: < 7/10  Patient unable to report pain score: N/A    Post-anesthetic transfer of care checklist/protocol to PACU/ICU- (426 and 427)  Upon conclusion of case, patient transferred to which of the following locations: PACU/Non-ICU  Use of transfer checklist/protocol: Yes  Exclusion: Service Performed in Patient Hospital Room (and thus did not require transfer): N/A  Unplanned admission to ICU related to anesthesia service up through end of PACU care- (MD51)  Unplanned admission to ICU (not initially anticipated at anesthesia start time): No

## 2020-04-02 NOTE — OP REPORT
DATE OF SERVICE:  04/02/2020    PREOPERATIVE DIAGNOSES:  Postmenopausal bleeding, pelvic pain.    POSTOPERATIVE DIAGNOSES:  Postmenopausal bleeding, pelvic pain.    FINAL PATHOLOGY:  Pending.    PROCEDURES:  Diagnostic hysteroscopy, dilatation and curettage/endocervical   curettage.    SURGEON:  Camron Longo MD    ANESTHESIA:  General.    ANESTHESIOLOGIST:  Leroy Cordoba MD    ESTIMATED BLOOD LOSS FOR THE PROCEDURE:  Less than 5 mL.    FINDINGS:  Small uterus with scant amount of tissue on endocervical and   endometrial curettings with sloughing endometrium noted on hysteroscopy.    DESCRIPTION OF PROCEDURE:  The patient was taken to the operating room where   general anesthesia was performed without difficulty.  The patient was then   prepped and draped in usual sterile fashion.  Lower extremities placed in   Michel stirrups.  Attention was first turned to the perineum where a weighted   speculum was placed with the use of Marroquin retractor.  Single-tooth tenaculum   was placed on the anterior lip of the cervix.  Cervix was then dilated up to 5   mm.  The diagnostic hysteroscope was then placed where sloughing endometrium   was appreciated.  This was followed by sharp endocervical followed by sharp   endometrial curettings of the endometrial cavity, both of which were sent to   pathology for further evaluation.  Procedure was then deemed complete.  The   patient tolerated the procedure well, was awoken from general anesthesia, was   taken to recovery in stable condition.       ____________________________________     MD MERNA CHURCHILL / ALINE    DD:  04/02/2020 08:24:34  DT:  04/02/2020 08:42:43    D#:  [id number]  Job#:  [id number]

## 2020-04-02 NOTE — DISCHARGE INSTRUCTIONS
ACTIVITY: Rest and take it easy for the first 24 hours.  A responsible adult is recommended to remain with you during that time.  It is normal to feel sleepy.  We encourage you to not do anything that requires balance, judgment or coordination.    MILD FLU-LIKE SYMPTOMS ARE NORMAL. YOU MAY EXPERIENCE GENERALIZED MUSCLE ACHES, THROAT IRRITATION, HEADACHE AND/OR SOME NAUSEA.    FOR 24 HOURS DO NOT:  Drive, operate machinery or run household appliances.  Drink beer or alcoholic beverages.   Make important decisions or sign legal documents.    SPECIAL INSTRUCTIONS:  Hysteroscopy  AFTER THE PROCEDURE   · If you had a general anesthetic, you may be groggy for a couple hours after the procedure.  · If you had a local anesthetic, you will be able to go home as soon as you are stable and feel ready.  · You may have some cramping. This normally lasts for a couple days.  · You may have bleeding, which varies from light spotting for a few days to menstrual-like bleeding for 3-7 days. This is normal.  · If your test results are not back during the visit, make an appointment with your health care provider to find out the results.  This information is not intended to replace advice given to you by your health care provider. Make sure you discuss any questions you have with your health care provider.  Document Released: 03/26/2002 Document Revised: 10/08/2014 Document Reviewed: 07/17/2014  [employer] Patient Education © 2017 [employer]    Dilation and Curettage or Vacuum Curettage  What happens after the procedure?  · You may have mild cramping, backache, pain, and light bleeding or spotting. You may pass small blood clots from your vagina.  · You may have to wear compression stockings. These stockings help to prevent blood clots and reduce swelling in your legs.  · Your blood pressure, heart rate, breathing rate, and blood oxygen level will be monitored until the medicines you were given have worn  off.  Summary  · Dilation and curettage (D&C) involves stretching (dilation) the cervix and scraping (curettage) the inside lining of the uterus (endometrium).  · After the procedure, you may have mild cramping, backache, pain, and light bleeding or spotting. You may pass small blood clots from your vagina.  · Plan to have someone take you home from the hospital or clinic.  This information is not intended to replace advice given to you by your health care provider. Make sure you discuss any questions you have with your health care provider.  Document Released: 12/18/2006 Document Revised: 09/03/2017 Document Reviewed: 09/03/2017  [employer] Interactive Patient Education © 2017 [employer]      DIET: To avoid nausea, slowly advance diet as tolerated, avoiding spicy or greasy foods for the first day.  Add more substantial food to your diet according to your physician's instructions.  Babies can be fed formula or breast milk as soon as they are hungry.  INCREASE FLUIDS AND FIBER TO AVOID CONSTIPATION.    FOLLOW-UP APPOINTMENT:  A follow-up appointment should be arranged with your doctor as scheduled; call to schedule.    You should CALL YOUR PHYSICIAN if you develop:  Fever greater than 101 degrees F.  Pain not relieved by medication, or persistent nausea or vomiting.  Excessive bleeding (blood soaking through dressing) or unexpected drainage from the wound.  Extreme redness or swelling around the incision site, drainage of pus or foul smelling drainage.  Inability to urinate or empty your bladder within 8 hours.  Problems with breathing or chest pain.    You should call 911 if you develop problems with breathing or chest pain.  If you are unable to contact your doctor or surgical center, you should go to the nearest emergency room or urgent care center.  Physician's telephone #: Dr. Longo [phone]    If any questions arise, call your doctor.  If your doctor is not available, please feel free to call the Surgical  Center at [phone].  The Center is open Monday through Friday from 7AM to 7PM.  You can also call the HEALTH HOTLINE open 24 hours/day, 7 days/week and speak to a nurse at [phone], or toll free at [phone].    A registered nurse may call you a few days after your surgery to see how you are doing after your procedure.    MEDICATIONS: Resume taking daily medication.  Take prescribed pain medication with food.  If no medication is prescribed, you may take non-aspirin pain medication if needed.  PAIN MEDICATION CAN BE VERY CONSTIPATING.  Take a stool softener or laxative such as senokot, pericolace, or milk of magnesia if needed.    Prescription given for Pain Relief.  Last pain medication given at 0854.    If your physician has prescribed pain medication that includes Acetaminophen (Tylenol), do not take additional Acetaminophen (Tylenol) while taking the prescribed medication.    Depression / Suicide Risk    As you are discharged from this [facility] facility, it is important to learn how to keep safe from harming yourself.    Recognize the warning signs:  · Abrupt changes in personality, positive or negative- including increase in energy   · Giving away possessions  · Change in eating patterns- significant weight changes-  positive or negative  · Change in sleeping patterns- unable to sleep or sleeping all the time   · Unwillingness or inability to communicate  · Depression  · Unusual sadness, discouragement and loneliness  · Talk of wanting to die  · Neglect of personal appearance   · Rebelliousness- reckless behavior  · Withdrawal from people/activities they love  · Confusion- inability to concentrate     If you or a loved one observes any of these behaviors or has concerns about self-harm, here's what you can do:  · Talk about it- your feelings and reasons for harming yourself  · Remove any means that you might use to hurt yourself (examples: pills, rope, extension cords, firearm)  · Get  professional help from the community (Mental Health, Substance Abuse, psychological counseling)  · Do not be alone:Call your Safe Contact- someone whom you trust who will be there for you.  · Call your local CRISIS HOTLINE 143-3165 or 165-096-7693  · Call your local Children's Mobile Crisis Response Team [place] (812) 608-1234 or www.WaveRx  · Call the toll free National Suicide Prevention Hotlines   · National Suicide Prevention Lifeline 839-410-LZIY (2107)  · National Hope Line Network 800-SUICIDE (637-5955)

## 2020-04-02 NOTE — OR SURGEON
Immediate Post OP Note    PreOp Diagnosis: Postmenopausal bleeding, pelvic pain.    PostOp Diagnosis: Same; final pathology pending    Procedure(s):  HYSTEROSCOPY, DIAGNOSTIC - Wound Class: Clean Contaminated  DILATION AND CURETTAGE - Wound Class: Clean Contaminated    Surgeon(s):  Camron Longo M.D.    Anesthesiologist/Type of Anesthesia:  Anesthesiologist: Leroy Cordoba M.D./General    Surgical Staff:  Circulator: Alysia Nugent R.N.  Scrub Person: Janine Hernandez    Specimens removed if any:  ID Type Source Tests Collected by Time Destination   A : Endocervical currettings Other Other PATHOLOGY SPECIMEN Camron Longo M.D. 4/2/2020  8:08 AM    B : endometrial currettings  Other Other PATHOLOGY SPECIMEN Camron Longo M.D. 4/2/2020  8:09 AM        Estimated Blood Loss: Less than 5ccs    Findings: Small uterus with scant amount of tissue on ECC and EMC on currettage with sloughing tissue noted on hysteroscopy.    Complications: None        4/2/2020 8:18 AM Camron Longo M.D.

## 2020-04-02 NOTE — ANESTHESIA PREPROCEDURE EVALUATION
66 yo, abnormal uterine bleeding    Relevant Problems   NEURO   (+) History of DVT (deep vein thrombosis)      CARDIAC   (+) Deep vein thrombosis (HCC) [I82.409]   (+) Essential hypertension       Physical Exam    Airway   Mallampati: II  TM distance: >3 FB  Neck ROM: full       Cardiovascular - normal exam  Rhythm: regular  Rate: normal  (-) murmur     Dental - normal exam         Pulmonary - normal exam  Breath sounds clear to auscultation     Abdominal    Neurological - normal exam                 Anesthesia Plan    ASA 2       Plan - general       Airway plan will be ETT        Induction: intravenous    Postoperative Plan: Postoperative administration of opioids is intended.    Pertinent diagnostic labs and testing reviewed    Informed Consent:    Anesthetic plan and risks discussed with patient.    Use of blood products discussed with: patient whom consented to blood products.

## 2020-04-02 NOTE — OR NURSING
0939: Patient arrived from PACU to Phase II. Patient is alert and awake, able to ambulate safely, tolerating PO intake. Updated on POC.    1015: Criteria met to discharge patient.    1020: Discharge paperwork reviewed with patient and daughter-in-law. Discussed activity, site care, worsening symptoms, and follow-up. Verbalized understanding. No further questions. PIV removed, tip intact.    1030: Patient escorted out in wheelchair with CNA. Discharge instructions and personal belongings in possession of the patient. Copy of discharge instructions signed and placed in the chart. Patient discharged to home with daughter-in-law.

## 2020-05-08 ENCOUNTER — OFFICE VISIT (OUTPATIENT)
Dept: VASCULAR LAB | Facility: MEDICAL CENTER | Age: 65
End: 2020-05-08
Payer: MEDICARE

## 2020-05-08 ENCOUNTER — APPOINTMENT (OUTPATIENT)
Dept: VASCULAR LAB | Facility: MEDICAL CENTER | Age: 65
End: 2020-05-08
Attending: INTERNAL MEDICINE
Payer: MEDICARE

## 2020-05-08 DIAGNOSIS — E78.5 DYSLIPIDEMIA: ICD-10-CM

## 2020-05-08 DIAGNOSIS — I10 ESSENTIAL HYPERTENSION: ICD-10-CM

## 2020-05-08 DIAGNOSIS — Z86.718 HISTORY OF DVT (DEEP VEIN THROMBOSIS): ICD-10-CM

## 2020-05-08 PROBLEM — Z79.01 LONG TERM (CURRENT) USE OF ANTICOAGULANTS: Status: RESOLVED | Noted: 2019-04-08 | Resolved: 2020-05-08

## 2020-05-08 PROCEDURE — 99443 PR PHYSICIAN TELEPHONE EVALUATION 21-30 MIN: CPT | Mod: CR | Performed by: NURSE PRACTITIONER

## 2020-05-08 ASSESSMENT — ENCOUNTER SYMPTOMS
HEADACHES: 0
DOUBLE VISION: 0
SORE THROAT: 0
FEVER: 0
MYALGIAS: 0
BLOOD IN STOOL: 0
COUGH: 0
DIARRHEA: 0
SEIZURES: 0
DEPRESSION: 1
WHEEZING: 0
VOMITING: 0
PALPITATIONS: 0
ABDOMINAL PAIN: 0
WEAKNESS: 0
TREMORS: 0
ORTHOPNEA: 0
NERVOUS/ANXIOUS: 1
INSOMNIA: 0
BLURRED VISION: 0
SHORTNESS OF BREATH: 0
BRUISES/BLEEDS EASILY: 0
CHILLS: 0
FOCAL WEAKNESS: 0
DIZZINESS: 0
NAUSEA: 0
HEMOPTYSIS: 0

## 2020-05-08 NOTE — PROGRESS NOTES
Telephone Appointment Visit   As a means of avoiding spread of COVID-19, this visit is being conducted by telephone. This telephone visit was initiated by the patient and they verbally consented.    Time at start of call: 1040      FOLLOW-UP VASCULAR VISIT  Subjective:   Ary Matta is a 65 y.o. female who presents today 05/08/2020 for   Referred for LOT determination of anticoagulation in context of LE DVT    HPI:    VTE disease / Anticoagulation:   Current symptoms:  Denies current SOB, CP, leg swelling, edema, leg pain, cyanosis of digits, redness of extremities.  Had hypercoag labs done.   Anticoagulant: currently on ASA daily, no bleeding  Date of initiation of anticoagulation: 4/1/19 stopped Xarelto as instructed after 3 months  Tx duration: 3mo    Pertinent VTE pmhx:   Date of Diagnosis: 4/1/19   Type of Venous thromboembolic disease (VTE): RLE soleal v DVT  Type of imaging: duplex   Preceding/presenting symptoms: Over last 2 years has had weight gain, increased inactivity after 's death 2 years ago.  Very stressed and still very emotional.  Had slept fine and awoke with excruciating pain in RLE calf.   VTE tx course:  xarelto 15mg BID for 3 weeks, xarelto 20mg daily   Any personal VTE hx? No  Any family VTE hx? Yes, Details: pat GF and mother had VTE after MVA and hip surgery, respectively     UNPROVOKED VS PROVOKED:   Recent surgery ? No  Recent trauma ? No  Smoker? No  Extended travel? No  WOMEN: Colorectal CA screening: yes       Cervical CA screening: yes       Breast CA screening: yes       Any HRT or birth control: no       Hx of recurrent miscarriages: no  Hypercoaguability work-up completed?  NO, ordered today     HTN:  Current HTN concerns: seen by cardio due to cough, SOB, CP- but no longer having these symptoms.  Echo normal. Tolerating olmesartan.  ADRs: yes, ACEI-induced cough.  Amlodipine swelling that was intolerable.  Recent studies/labs completed (reviewed with patient,  noted below): Yes  HTN sx:  No current blurred or changed vision, chest pain, shortness of breath, headache, nausea, dizziness/vertigo   Home BP log: svg 118/66  24h ABPM completed: not tested  Adherence to current HTN meds: compliant all of the time     Hyperlipidemia:    Stable, no current concerns  Current treatment: High intensity statin   rosuva 20mg  Myalgias? No  Other adverse drug reactions? No  Lipid profile: no recent labs    Stress:  Significant amt of emotional/physical/mental stress due to uterine biopsy after having abdominal cramping and vaginal bleeding.  Nothing definitive on results, however Dr. Longo recommends a hysterectomy.  Planned for end of May-- appt next week with Qing to discuss.        Social History     Tobacco Use   • Smoking status: Never Smoker   • Smokeless tobacco: Never Used   Substance Use Topics   • Alcohol use: Yes     Comment: wine daily   • Drug use: No     Outpatient Encounter Medications as of 5/8/2020   Medication Sig Dispense Refill   • Cholecalciferol (DIALYVITE VITAMIN D 5000 PO) Take  by mouth every day.     • OMEPRAZOLE PO Take 20 mg by mouth.     • aspirin EC (ECOTRIN) 81 MG Tablet Delayed Response Take 81 mg by mouth every day.     • olmesartan-hydrochlorothiazide (BENICAR HCT) 40-12.5 MG per tablet Take 1 Tab by mouth every day. 30 Tab 11   • rosuvastatin (CRESTOR) 20 MG Tab Take 20 mg by mouth.  2     No facility-administered encounter medications on file as of 5/8/2020.      Allergies   Allergen Reactions   • Macrodantin [Kdc:Red Dye+Yellow Dye+Ci Pigment Blue 63+Nitrofurantoin] Anaphylaxis   • Amlodipine Swelling   • Lisinopril Cough   • Bloodless    • Pcn [Penicillins]      Rash, itching and red     DIET AND EXERCISE:  Weight Change:none   BMI Readings from Last 4 Encounters:   04/02/20 34.02 kg/m²   11/15/19 33.30 kg/m²   08/09/19 31.65 kg/m²   08/05/19 31.58 kg/m²      Diet: common adult  Exercise: minimal exercise     Review of Systems   Constitutional:  Negative for chills, fever and malaise/fatigue.   HENT: Negative for nosebleeds, sore throat and tinnitus.    Eyes: Negative for blurred vision and double vision.   Respiratory: Negative for cough, hemoptysis, shortness of breath and wheezing.    Cardiovascular: Negative for chest pain, palpitations, orthopnea and leg swelling.   Gastrointestinal: Negative for abdominal pain, blood in stool, diarrhea, melena, nausea and vomiting.   Genitourinary: Negative for hematuria.        Vaginal spotting    Musculoskeletal: Negative for joint pain and myalgias.   Skin: Negative for itching and rash.   Neurological: Negative for dizziness, tremors, focal weakness, seizures, weakness and headaches.   Endo/Heme/Allergies: Does not bruise/bleed easily.   Psychiatric/Behavioral: Positive for depression. The patient is nervous/anxious. The patient does not have insomnia.       Objective:     There were no vitals filed for this visit.   BP Readings from Last 5 Encounters:   04/02/20 120/62   11/15/19 150/79   08/09/19 125/83   08/05/19 118/78   07/23/19 (!) 170/92      There is no height or weight on file to calculate BMI.  Physical Exam     Lab Results   Component Value Date    PROTHROMBTM 13.2 04/01/2019    INR 1.01 04/01/2019         Lab Results   Component Value Date    SODIUM 138 04/02/2020    POTASSIUM 4.8 04/02/2020    CHLORIDE 101 04/02/2020    CO2 22 04/02/2020    GLUCOSE 94 04/02/2020    BUN 21 04/02/2020    CREATININE 1.12 04/02/2020    IFAFRICA 59 (A) 04/02/2020    IFNOTAFR 49 (A) 04/02/2020        Lab Results   Component Value Date    WBC 6.6 04/02/2020    RBC 3.79 (L) 04/02/2020    HEMOGLOBIN 12.6 04/02/2020    HEMATOCRIT 38.4 04/02/2020    .3 (H) 04/02/2020    MCH 33.2 (H) 04/02/2020    MCHC 32.8 (L) 04/02/2020    MPV 11.2 04/02/2020    VASCULAR IMAGING:     Last EKG:   Results for orders placed or performed during the hospital encounter of 04/02/20   ECG   Result Value Ref Range    Report       [facility]    Test Date:  2020  Pt Name:    ALEXANDER STARR               Department: [facility]  MRN:        [id number]                      Room:       [facility]  Gender:     Female                       Technician: CONSUELO  :        1955                   Requested By:MERNA FLOYD  Order #:    [id number]                    Reading MD: Yesi Milton MD    Measurements  Intervals                                Axis  Rate:       81                           P:          48  CT:         148                          QRS:        13  QRSD:       78                           T:          37  QT:         396  QTc:        460    Interpretive Statements  SINUS RHYTHM  Compared to ECG 2019 11:24:41  No significant changes  Electronically Signed On 2020 12:41:25 PDT by Yesi Milton MD       RLE venous 19   Right soleal vein thrombosis form mid to distal calf.    CTA PE 19  No pulmonary embolus. No acute abnormality in the chest.  Aorta and great vessels: No aneurysm. Atherosclerosis.    CHRISTOPHER duplex    No evidence of renal artery stenosis.    Echo - ordered, pending per cardiology    ETT 19  Normal, achieved max protocol     Medical Decision Making:  Today's Assessment / Status / Plan:     1. History of DVT (deep vein thrombosis)     2. Essential hypertension     3. Dyslipidemia       Patient Type: Primary Prevention    Etiology of Established CVD if Present:   1) subclinical aortic atherosclerosis per CT scan     Anticoagulation:  Indication for anticoagulation: distal RLE DVT   Anti-Platelet/Anti-Coagulant Tx: yes  Consider unprovoked distal DVT.  Probably treatment with AC was appropriate however, literature mixed on whether distal RLE DVT confers any worsening risk of post-DVT syndrome and/or PE, so minimum treatment of 3mo can be completed and monitor closely.   She has completed therapy.  Risk of recurrent DVT is possible, so will monitor closely. Unprovoked nature and fhx of 2  relatives with VTE  Hypercoag panel negative  Anti-Coagulation Plan:  - Continue ASA 81mg 1 tabs daily    - Again counseled on signs and symptoms of acute VTE that require seeking prompt attention in the ED to include shortness of breath, chest pain, pain with deep inhalation, acute leg swelling and/or pain in calf or leg.  - Recommend Lovenox 40mg once daily for prophylaxis following her surgery d/t hx of DVT.  Initial VTE was related to immobility, and at high risk for recurrent VTE.  Alternatively, if pt is not willing to perform injections, she could take Xarelto prophylactically, however this has not been adequately studied and would be considered off label.  Pt will discuss options with surgeon.  Will follow up with phone visit next week.       Lipid Management: Qualifies for Statin Therapy Based on 2013 ACC/AHA Guidelines: no  Calculated 10-Year Risk of ASCVD: N/A  Currently on Statin: Yes  NLA Risk Category:   High:  3+ major RFs  Tx threshold: non-HDL-C >129, LDL-C >99  Tx goal:  non-HDL <130, LDL-C <100 (optional: apoB<90)   At goal: unknown  Plan:  - Reinforced ongoing TLC measures   - Continue rosuva 20mg, titrate to 40mg if not in control  - Add zetia 10mg as next agent to achieve goal   - Defer ongoing mgmt and labs to PCP     Blood Pressure Management: Goal: ACC/AHA (2017) goal <130/80  Home BP at goal:  yes  Office BP at goal:  No, mild elevated DBP only   Echo: consider testing   ACR: pending  Device candidate? Possibly and can review at next visit   CHRISTOPHER duplex negative.   Presence of lyte abnormalities may provide clinical clue to underlying hormonal causes such as primary seda vs Cushing's.    Discussion of stress reduction techniques  Plan:   - Will defer additional w/u and tx planning after today's visit to PCP, but our resistant HTN clinic will remain available as needed   Monitoring:   - Continue home BP monitoring, reviewed correct technique:  Yes   - Order 24h ABPM:  NO  - Monitor lytes/gfr  routinely   - Monitor BP at home on log    Medications:  ACEi/ARB: ACEI caused cough, continue olmesartan 40mg   DHP-CCB: stopped amlodipine due to swelling   Thiazide: None  Other:   MRA: add spironolactone 25mg as next agent and monitor lytes/gfr    Glycemic Status: Normal    Smoking:  continued complete avoidance of all tobacco products     Physical Activity: continue healthy activity to improve CV fitness, see care instructions for additional details     Weight Management and Nutrition: Dietary plan was discussed with patient at this visit including DASH, low sodium and/or as outlined in care instructions     Other:   1) weight gain - hormonal eval underway per PCP  - reduce kcal, increase activity, f/u with PCP     2) insomnia.  Can be implicated in weight gain and high BP.  Need 4h/noc minimum to reduce sympathetic.     - stress reduction measures  - f/u with PCP for further evlauation     3) Postmenopasual vaginal bleeding- scheduled for hysterectomy.  Will request OV from Dr. Longo.  Recommend prophylactic anticoagulation following surgery d/t high risk for recurrent DVT.      Instructed to follow-up with PCP for remainder of adult medical needs: yes  We will partner with other providers in the management of established vascular disease and cardiometabolic risk factors.    Studies to Be Obtained: None  Labs to Be Obtained: None    Follow up in: 2 weeks to discuss AC    Janae Queen, A.P.KATALINA    End of call: 1110am

## 2020-05-12 ENCOUNTER — TELEPHONE (OUTPATIENT)
Dept: VASCULAR LAB | Facility: MEDICAL CENTER | Age: 65
End: 2020-05-12

## 2020-05-12 NOTE — TELEPHONE ENCOUNTER
Spoke with pt over phone regarding recommendation from Dr. Aldrich for Lovenox 40mg subQ once daily until ambulatory, probably around 10 days.  She understands and will discuss this with her surgeon. Explained we are happy to help with her anticoagulation post-op if the surgeon would like to defer to our office.  She will let us know after her consult this week.    Janae Queen APRHEBER  [facility]

## 2020-05-14 NOTE — H&P
DATE OF SCHEDULED SURGERY:  2020    REASON FOR SURGERY:  Refractory postmenopausal bleeding; intrauterine mass;   pelvic pressure, pain; mixed urinary incontinence; pelvic floor relaxation.    HISTORY OF PRESENT ILLNESS:  This is a 65-year-old woman,  2, para 1,   who had been kindly sent and referred to our office by Dr. Mei Gonzalez after   the patient had been seen and evaluated by another GYN provider in 2019.    The patient was seen and evaluated for deep vein thrombosis and was started on   Xarelto.  Subsequently she had postmenopausal bleeding and was seen and   evaluated by Dr. Esther Persaud, and the patient states that she had undergone   a workup at that time of the postmenopausal bleeding with no obvious source.    The patient was expectantly managed for her postmenopausal bleeding over the   course of the last year.  More recently, the patient states that she has had   increasing complaints of bleeding and what she feels is passage of tissue.    She was growing increasingly concerned, was seen and evaluated by Dr. Esther Persaud, at which time the patient states that the speculum was unable to be   inserted by the provider secondary to labial agglutination.  The patient did   undergo a pelvic ultrasound and was reassured by Dr. Persaud, however, the   patient has had increasing complaints of bleeding and now states that she had   passage of tissue and wished to proceed forward with a second opinion.  Upon   arrival to our office, the patient attempted to undergo a workup, however, was   uncomfortable with the examination.  Attempt at an office endometrial biopsy   was unsuccessful.  Given the intrauterine mass, the patient had been counseled   and was electing to proceed forward with a dilation and curettage.  The   patient did undergo a dilation and curettage, however, insufficient tissue was   obtained at that time.  The patient has also had issues with regard to her   bladder and  urinary loss and is also followed by Dr. Michael Johnson.  Patient   is a Christianity and requires no blood transfusion.  Patient states at   this time, she has exhausted all conservative measures and is now wishing to   proceed forward with attempted definitive surgical correction given her   persistent after the dilation and curettage of hyperechoic solid structure   seen impinging on the right side of the endometrial cavity measuring 1.5x1.7   cm.  Also, fluid was seen within the endometrial cavity measuring 3.9 mm.  The   patient is scheduled to undergo urodynamic testing prior to her surgical   procedure.  She has stated that she has exhausted all conservative measures.    She did attempt to undergo a conservative procedure in the operating room in   the form of a dilation and curettage, which demonstrated insufficient tissue.    The patient states that she has now exhausted all conservative measures and   is now wishing to proceed forward with an attempted definitive surgical   correction with a laparoscopic-assisted vaginal hysterectomy, bilateral   salpingo-oophorectomy, native tissue repair in the form of an anterior and   posterior vaginal repair, enterocele repair, perineoplasty, possible   sacrospinous vault suspension and transobturator tape midurethral sling   procedure.  Risks, benefits and alternatives of all individual portions of the   surgery were addressed with the patient in detail.  She has asked appropriate   questions, signed the appropriate consents and is wishing to proceed forward   with surgery as planned.  She does state that she understands the limitations   of surgery in addressing her pelvic pain, postmenopausal bleeding and   occasional overactive bladder symptoms.  She does state that she understands   she may need additional workup and management even possibly by gynecologic   oncology should this be demonstrated to be consistent with endometrial   adenocarcinoma.  Given we  have been unable to confirm any evidence of an   adenocarcinoma at this time, she is declining a referral to Dr. Bolton and   continued expectant management with gynecologic oncology until a firm   diagnosis has been made.  In doing so, she does understand she may need   additional medical or surgical procedures, and even despite this she wishes to   proceed forward with the surgery at this time.  She also understands the   limitations of her pelvic pain, overactive bladder symptoms as these symptoms   may be unimproved or actually worsen with time requiring additional medical or   surgical management and possible additional management by Dr. Michael Johnson   to address continued ongoing bladder concerns.    PAST MEDICAL HISTORY:  Lumbago, chronic hypertension, deep vein thrombosis,   hypercholesterolemia, gastroesophageal reflux disease.  Otherwise, as stated   above.    PAST SURGICAL HISTORY:  Dilation and curettage done on 2003, at which   time multiple fragments of benign endocervical glandular tissue with   surrounding clot and mucous had been appreciated with no obvious endometrial   glandular epithelium or endometrial stroma identified on the final pathology.    OBSTETRICAL HISTORY:  The patient had 1 previous delivery in , 1   spontaneous .    GYNECOLOGIC HISTORY:  The patient has been intermittently having   postmenopausal bleeding over the last year.  She does report pelvic pain,   dyspareunia, pressure, urinary incontinence, recurrent urinary tract   infections.  She denies any previous sexually transmitted diseases or pelvic   infections.    FAMILY HISTORY:  Remarkable for one of her sisters having colon and cervical   cancer.    REVIEW OF SYSTEMS:  Otherwise unremarkable.    SOCIAL HISTORY:  She is .  She is an .  She reports   3-5 alcoholic drinks per week.  She denies tobacco or other drug use.    MEDICATIONS:  Aspirin 81 mg tablets daily,  losartan/hydrochlorothiazide   50/12.5 mg tablets daily, omeprazole 20 mg tablets daily, rosuvastatin 20 mg   tablets daily, vitamin D3 125 mcg tablets daily.    ALLERGIES:  TO PENICILLIN.    PHYSICAL EXAMINATION  GENERAL:  She is afebrile, hemodynamically stable.  CURRENT VITAL SIGNS:  Can be seen in electronic medical record.  HEART:  Regular rate and rhythm.  CHEST:  Clear to auscultation bilaterally.  ABDOMEN:  Soft, obese, nontender.  PELVIC:  Exam shows atrophic external female genitalia, vaginal vault.  She   does have a minimal amount of labial agglutination superiorly below the   clitoral tabares.  She has atrophic external female genitalia, vaginal vault with   a narrowed introitus.  Her cervix was visualized and found to be free of any   obvious pathology.  Bimanual examination shows tenderness to palpation at the   level of the cervix as well as the uterine fundus.  No adnexal masses or   tenderness to palpation were appreciated otherwise on bimanual examination.    Rectovaginal exam confirmed the above.  She is guaiac negative.  EXTREMITIES:  Nontender.      DIAGNOSTIC DATA:  Transvaginal pelvic ultrasound is as stated above.    Urodynamics study is still to be performed prior to surgery, however, the   patient does give a strong history of type 2 stress urinary incontinence with   overactive bladder symptoms as well as recurrent urinary tract infections for   which she has been followed by Dr. Michael Johnson.    ASSESSMENT AND PLAN:  A 65-year-old postmenopausal woman with persistent   postmenopausal bleeding over the course of a year, now with increasing   complaints of pelvic pressure and pain, continued spotting and passage of   tissue, status post a dilation and curettage which demonstrated no obvious   endometrial tissue, now wishing to proceed forward with the surgery as   described above, understanding the multiple limitations of surgery and the   possibility of need for further medical or  surgical management.  Even despite   these limitations of surgery, she is wishing to proceed forward with the   scheduled surgery as planned on 05/26/2020.       ____________________________________     MD MERNA CHURCHILL / ALINE    DD:  05/13/2020 19:52:00  DT:  05/13/2020 22:01:44    D#:  [id number]  Job#:  [id number]

## 2020-05-21 ENCOUNTER — OFFICE VISIT (OUTPATIENT)
Dept: ADMISSIONS | Facility: MEDICAL CENTER | Age: 65
End: 2020-05-21
Attending: SPECIALIST
Payer: MEDICARE

## 2020-05-21 DIAGNOSIS — Z01.812 PRE-OPERATIVE LABORATORY EXAMINATION: ICD-10-CM

## 2020-05-21 LAB
ANION GAP SERPL CALC-SCNC: 15 MMOL/L (ref 7–16)
BUN SERPL-MCNC: 22 MG/DL (ref 8–22)
CALCIUM SERPL-MCNC: 9.8 MG/DL (ref 8.5–10.5)
CHLORIDE SERPL-SCNC: 103 MMOL/L (ref 96–112)
CO2 SERPL-SCNC: 21 MMOL/L (ref 20–33)
COVID ORDER STATUS COVID19: NORMAL
CREAT SERPL-MCNC: 1.08 MG/DL (ref 0.5–1.4)
ERYTHROCYTE [DISTWIDTH] IN BLOOD BY AUTOMATED COUNT: 48.3 FL (ref 35.9–50)
GLUCOSE SERPL-MCNC: 113 MG/DL (ref 65–99)
HCT VFR BLD AUTO: 39.6 % (ref 37–47)
HGB BLD-MCNC: 12.7 G/DL (ref 12–16)
MCH RBC QN AUTO: 32.3 PG (ref 27–33)
MCHC RBC AUTO-ENTMCNC: 32.1 G/DL (ref 33.6–35)
MCV RBC AUTO: 100.8 FL (ref 81.4–97.8)
PLATELET # BLD AUTO: 261 K/UL (ref 164–446)
PMV BLD AUTO: 11 FL (ref 9–12.9)
POTASSIUM SERPL-SCNC: 4 MMOL/L (ref 3.6–5.5)
RBC # BLD AUTO: 3.93 M/UL (ref 4.2–5.4)
SODIUM SERPL-SCNC: 139 MMOL/L (ref 135–145)
WBC # BLD AUTO: 8.5 K/UL (ref 4.8–10.8)

## 2020-05-21 PROCEDURE — 36415 COLL VENOUS BLD VENIPUNCTURE: CPT

## 2020-05-21 PROCEDURE — 80048 BASIC METABOLIC PNL TOTAL CA: CPT

## 2020-05-21 PROCEDURE — 85027 COMPLETE CBC AUTOMATED: CPT

## 2020-05-21 PROCEDURE — C9803 HOPD COVID-19 SPEC COLLECT: HCPCS

## 2020-05-21 SDOH — HEALTH STABILITY: MENTAL HEALTH: HOW OFTEN DO YOU HAVE A DRINK CONTAINING ALCOHOL?: 4 OR MORE TIMES A WEEK

## 2020-05-21 SDOH — HEALTH STABILITY: MENTAL HEALTH: HOW MANY STANDARD DRINKS CONTAINING ALCOHOL DO YOU HAVE ON A TYPICAL DAY?: 1 OR 2

## 2020-05-21 ASSESSMENT — FIBROSIS 4 INDEX: FIB4 SCORE: 1.34

## 2020-05-23 LAB
SARS-COV-2 RNA RESP QL NAA+PROBE: NOT DETECTED
SPECIMEN SOURCE: NORMAL

## 2020-05-26 ENCOUNTER — HOSPITAL ENCOUNTER (OUTPATIENT)
Facility: MEDICAL CENTER | Age: 65
End: 2020-05-26
Attending: SPECIALIST | Admitting: SPECIALIST
Payer: MEDICARE

## 2020-05-26 ENCOUNTER — ANESTHESIA (OUTPATIENT)
Dept: SURGERY | Facility: MEDICAL CENTER | Age: 65
End: 2020-05-26
Payer: MEDICARE

## 2020-05-26 ENCOUNTER — ANESTHESIA EVENT (OUTPATIENT)
Dept: SURGERY | Facility: MEDICAL CENTER | Age: 65
End: 2020-05-26
Payer: MEDICARE

## 2020-05-26 VITALS
TEMPERATURE: 97.9 F | SYSTOLIC BLOOD PRESSURE: 152 MMHG | DIASTOLIC BLOOD PRESSURE: 105 MMHG | HEART RATE: 96 BPM | OXYGEN SATURATION: 93 % | BODY MASS INDEX: 33.5 KG/M2 | WEIGHT: 196.21 LBS | HEIGHT: 64 IN | RESPIRATION RATE: 16 BRPM

## 2020-05-26 LAB — PATHOLOGY CONSULT NOTE: NORMAL

## 2020-05-26 PROCEDURE — 500854 HCHG NEEDLE, INSUFFLATION FOR STEP: Performed by: SPECIALIST

## 2020-05-26 PROCEDURE — 700102 HCHG RX REV CODE 250 W/ 637 OVERRIDE(OP)

## 2020-05-26 PROCEDURE — 88309 TISSUE EXAM BY PATHOLOGIST: CPT

## 2020-05-26 PROCEDURE — 500886 HCHG PACK, LAPAROSCOPY: Performed by: SPECIALIST

## 2020-05-26 PROCEDURE — 700105 HCHG RX REV CODE 258: Performed by: SPECIALIST

## 2020-05-26 PROCEDURE — 160036 HCHG PACU - EA ADDL 30 MINS PHASE I: Performed by: SPECIALIST

## 2020-05-26 PROCEDURE — 700102 HCHG RX REV CODE 250 W/ 637 OVERRIDE(OP): Performed by: SPECIALIST

## 2020-05-26 PROCEDURE — 88307 TISSUE EXAM BY PATHOLOGIST: CPT

## 2020-05-26 PROCEDURE — 160041 HCHG SURGERY MINUTES - EA ADDL 1 MIN LEVEL 4: Performed by: SPECIALIST

## 2020-05-26 PROCEDURE — 501579 HCHG TROCAR, STEP 5MM: Performed by: SPECIALIST

## 2020-05-26 PROCEDURE — 502703 HCHG DEVICE, LIGASURE V SEALER: Performed by: SPECIALIST

## 2020-05-26 PROCEDURE — A4338 INDWELLING CATHETER LATEX: HCPCS | Performed by: SPECIALIST

## 2020-05-26 PROCEDURE — 160009 HCHG ANES TIME/MIN: Performed by: SPECIALIST

## 2020-05-26 PROCEDURE — 160035 HCHG PACU - 1ST 60 MINS PHASE I: Performed by: SPECIALIST

## 2020-05-26 PROCEDURE — 700111 HCHG RX REV CODE 636 W/ 250 OVERRIDE (IP)

## 2020-05-26 PROCEDURE — 160029 HCHG SURGERY MINUTES - 1ST 30 MINS LEVEL 4: Performed by: SPECIALIST

## 2020-05-26 PROCEDURE — 501838 HCHG SUTURE GENERAL: Performed by: SPECIALIST

## 2020-05-26 PROCEDURE — 160047 HCHG PACU  - EA ADDL 30 MINS PHASE II: Performed by: SPECIALIST

## 2020-05-26 PROCEDURE — 502648 HCHG APPLICATOR, EVICEL: Performed by: SPECIALIST

## 2020-05-26 PROCEDURE — 160046 HCHG PACU - 1ST 60 MINS PHASE II: Performed by: SPECIALIST

## 2020-05-26 PROCEDURE — 700101 HCHG RX REV CODE 250: Performed by: SPECIALIST

## 2020-05-26 PROCEDURE — A9270 NON-COVERED ITEM OR SERVICE: HCPCS

## 2020-05-26 PROCEDURE — 110454 HCHG SHELL REV 250: Performed by: SPECIALIST

## 2020-05-26 PROCEDURE — 501577 HCHG TROCAR, STEP 11MM: Performed by: SPECIALIST

## 2020-05-26 PROCEDURE — 501330 HCHG SET, CYSTO IRRIG TUBING: Performed by: SPECIALIST

## 2020-05-26 PROCEDURE — A9270 NON-COVERED ITEM OR SERVICE: HCPCS | Performed by: SPECIALIST

## 2020-05-26 PROCEDURE — 502240 HCHG MISC OR SUPPLY RC 0272: Performed by: SPECIALIST

## 2020-05-26 PROCEDURE — 160002 HCHG RECOVERY MINUTES (STAT): Performed by: SPECIALIST

## 2020-05-26 PROCEDURE — 160048 HCHG OR STATISTICAL LEVEL 1-5: Performed by: SPECIALIST

## 2020-05-26 PROCEDURE — 160025 RECOVERY II MINUTES (STATS): Performed by: SPECIALIST

## 2020-05-26 PROCEDURE — 501516 HCHG SUTURE, CAPIO: Performed by: SPECIALIST

## 2020-05-26 PROCEDURE — C1771 REP DEV, URINARY, W/SLING: HCPCS | Performed by: SPECIALIST

## 2020-05-26 PROCEDURE — 700101 HCHG RX REV CODE 250

## 2020-05-26 DEVICE — SLING TOT OBTRYX I HALO: Type: IMPLANTABLE DEVICE | Site: URETHRA | Status: FUNCTIONAL

## 2020-05-26 RX ORDER — PROMETHAZINE HYDROCHLORIDE 25 MG/1
12.5 SUPPOSITORY RECTAL EVERY 4 HOURS PRN
Status: DISCONTINUED | OUTPATIENT
Start: 2020-05-26 | End: 2020-05-26 | Stop reason: HOSPADM

## 2020-05-26 RX ORDER — DIPHENHYDRAMINE HYDROCHLORIDE 50 MG/ML
12.5 INJECTION INTRAMUSCULAR; INTRAVENOUS
Status: DISCONTINUED | OUTPATIENT
Start: 2020-05-26 | End: 2020-05-26 | Stop reason: HOSPADM

## 2020-05-26 RX ORDER — METOCLOPRAMIDE HYDROCHLORIDE 5 MG/ML
INJECTION INTRAMUSCULAR; INTRAVENOUS PRN
Status: DISCONTINUED | OUTPATIENT
Start: 2020-05-26 | End: 2020-05-26 | Stop reason: SURG

## 2020-05-26 RX ORDER — DEXAMETHASONE SODIUM PHOSPHATE 4 MG/ML
INJECTION, SOLUTION INTRA-ARTICULAR; INTRALESIONAL; INTRAMUSCULAR; INTRAVENOUS; SOFT TISSUE PRN
Status: DISCONTINUED | OUTPATIENT
Start: 2020-05-26 | End: 2020-05-26 | Stop reason: SURG

## 2020-05-26 RX ORDER — SIMETHICONE 80 MG
80 TABLET,CHEWABLE ORAL EVERY 8 HOURS PRN
Status: DISCONTINUED | OUTPATIENT
Start: 2020-05-26 | End: 2020-05-26 | Stop reason: HOSPADM

## 2020-05-26 RX ORDER — OXYCODONE HCL 5 MG/5 ML
10 SOLUTION, ORAL ORAL
Status: COMPLETED | OUTPATIENT
Start: 2020-05-26 | End: 2020-05-26

## 2020-05-26 RX ORDER — OXYCODONE HCL 5 MG/5 ML
5 SOLUTION, ORAL ORAL
Status: COMPLETED | OUTPATIENT
Start: 2020-05-26 | End: 2020-05-26

## 2020-05-26 RX ORDER — HALOPERIDOL 5 MG/ML
1 INJECTION INTRAMUSCULAR
Status: DISCONTINUED | OUTPATIENT
Start: 2020-05-26 | End: 2020-05-26 | Stop reason: HOSPADM

## 2020-05-26 RX ORDER — ONDANSETRON 2 MG/ML
4 INJECTION INTRAMUSCULAR; INTRAVENOUS
Status: DISCONTINUED | OUTPATIENT
Start: 2020-05-26 | End: 2020-05-26 | Stop reason: HOSPADM

## 2020-05-26 RX ORDER — SODIUM CHLORIDE, SODIUM LACTATE, POTASSIUM CHLORIDE, CALCIUM CHLORIDE 600; 310; 30; 20 MG/100ML; MG/100ML; MG/100ML; MG/100ML
INJECTION, SOLUTION INTRAVENOUS CONTINUOUS
Status: DISCONTINUED | OUTPATIENT
Start: 2020-05-26 | End: 2020-05-26 | Stop reason: HOSPADM

## 2020-05-26 RX ORDER — CEFAZOLIN SODIUM 1 G/3ML
INJECTION, POWDER, FOR SOLUTION INTRAMUSCULAR; INTRAVENOUS PRN
Status: DISCONTINUED | OUTPATIENT
Start: 2020-05-26 | End: 2020-05-26 | Stop reason: SURG

## 2020-05-26 RX ORDER — BUPIVACAINE HYDROCHLORIDE AND EPINEPHRINE 2.5; 5 MG/ML; UG/ML
INJECTION, SOLUTION EPIDURAL; INFILTRATION; INTRACAUDAL; PERINEURAL
Status: DISCONTINUED | OUTPATIENT
Start: 2020-05-26 | End: 2020-05-26 | Stop reason: HOSPADM

## 2020-05-26 RX ORDER — ONDANSETRON 2 MG/ML
INJECTION INTRAMUSCULAR; INTRAVENOUS PRN
Status: DISCONTINUED | OUTPATIENT
Start: 2020-05-26 | End: 2020-05-26 | Stop reason: SURG

## 2020-05-26 RX ORDER — BUPIVACAINE HYDROCHLORIDE AND EPINEPHRINE 2.5; 5 MG/ML; UG/ML
INJECTION, SOLUTION EPIDURAL; INFILTRATION; INTRACAUDAL; PERINEURAL
Status: DISCONTINUED
Start: 2020-05-26 | End: 2020-05-26 | Stop reason: HOSPADM

## 2020-05-26 RX ADMIN — FENTANYL CITRATE 250 MCG: 50 INJECTION INTRAMUSCULAR; INTRAVENOUS at 07:34

## 2020-05-26 RX ADMIN — OXYCODONE HYDROCHLORIDE 10 MG: 5 SOLUTION ORAL at 09:50

## 2020-05-26 RX ADMIN — SODIUM CHLORIDE, POTASSIUM CHLORIDE, SODIUM LACTATE AND CALCIUM CHLORIDE: 600; 310; 30; 20 INJECTION, SOLUTION INTRAVENOUS at 10:55

## 2020-05-26 RX ADMIN — FENTANYL CITRATE 50 MCG: 50 INJECTION INTRAMUSCULAR; INTRAVENOUS at 09:50

## 2020-05-26 RX ADMIN — FENTANYL CITRATE 100 MCG: 50 INJECTION INTRAMUSCULAR; INTRAVENOUS at 09:02

## 2020-05-26 RX ADMIN — SODIUM CHLORIDE, POTASSIUM CHLORIDE, SODIUM LACTATE AND CALCIUM CHLORIDE: 600; 310; 30; 20 INJECTION, SOLUTION INTRAVENOUS at 06:49

## 2020-05-26 RX ADMIN — CEFAZOLIN 2 G: 330 INJECTION, POWDER, FOR SOLUTION INTRAMUSCULAR; INTRAVENOUS at 07:32

## 2020-05-26 RX ADMIN — FENTANYL CITRATE 50 MCG: 50 INJECTION INTRAMUSCULAR; INTRAVENOUS at 10:09

## 2020-05-26 RX ADMIN — METOCLOPRAMIDE 10 MG: 5 INJECTION, SOLUTION INTRAMUSCULAR; INTRAVENOUS at 07:34

## 2020-05-26 RX ADMIN — POVIDONE-IODINE 15 ML: 10 SOLUTION TOPICAL at 06:49

## 2020-05-26 RX ADMIN — ONDANSETRON 8 MG: 2 INJECTION INTRAMUSCULAR; INTRAVENOUS at 07:34

## 2020-05-26 RX ADMIN — ROCURONIUM BROMIDE 50 MG: 10 INJECTION, SOLUTION INTRAVENOUS at 07:35

## 2020-05-26 RX ADMIN — SUGAMMADEX 200 MG: 100 INJECTION, SOLUTION INTRAVENOUS at 09:13

## 2020-05-26 RX ADMIN — DEXAMETHASONE SODIUM PHOSPHATE 8 MG: 4 INJECTION, SOLUTION INTRA-ARTICULAR; INTRALESIONAL; INTRAMUSCULAR; INTRAVENOUS; SOFT TISSUE at 07:34

## 2020-05-26 RX ADMIN — PROPOFOL 200 MG: 10 INJECTION, EMULSION INTRAVENOUS at 07:34

## 2020-05-26 RX ADMIN — FENTANYL CITRATE 50 MCG: 50 INJECTION INTRAMUSCULAR; INTRAVENOUS at 10:40

## 2020-05-26 RX ADMIN — SODIUM CHLORIDE, POTASSIUM CHLORIDE, SODIUM LACTATE AND CALCIUM CHLORIDE: 600; 310; 30; 20 INJECTION, SOLUTION INTRAVENOUS at 07:26

## 2020-05-26 ASSESSMENT — FIBROSIS 4 INDEX: FIB4 SCORE: 1.16

## 2020-05-26 NOTE — ANESTHESIA POSTPROCEDURE EVALUATION
Patient: Ary Matta    Procedure Summary     Date:  05/26/20 Room / Location:  [facility] ROOM 23 / SURGERY SAME DAY [facility]    Anesthesia Start:  0726 Anesthesia Stop:  0932    Procedures:       HYSTERECTOMY, TOTAL, VAGINAL, LAPAROSCOPY-ASSISTED (N/A Uterus)      SALPINGO-OOPHORECTOMY (Bilateral Fallopian Tube)      COLPORRHAPHY, COMBINED ANTEROPOSTERIOR - W/PERINEOPLASTY (N/A Vagina )      REPAIR, ENTEROCELE (N/A Vagina )      BLADDER SLING, FEMALE - TOT (N/A Urethra)      COLPOPEXY - SACROSPINOUS VAULT SUSPENSION (N/A Vagina ) Diagnosis:  (POSTMENOPAUSAL BLEEDING, PELVIC PAIN, STRESS URINARY INCONTINENCE, CYSTOCELE)    Surgeon:  Camron Longo M.D. Responsible Provider:  Moo Smalls M.D.    Anesthesia Type:  general ASA Status:  3          Final Anesthesia Type: general  Last vitals  BP   Blood Pressure : 124/60    Temp   36.6 °C (97.9 °F)    Pulse   Pulse: (!) 103   Resp   16    SpO2   100 %      Anesthesia Post Evaluation    Patient location during evaluation: PACU  Patient participation: complete - patient participated  Level of consciousness: awake and alert    Airway patency: patent  Anesthetic complications: no  Cardiovascular status: hemodynamically stable  Respiratory status: acceptable  Hydration status: euvolemic    PONV: none           Nurse Pain Score: 4 (NPRS)

## 2020-05-26 NOTE — OP REPORT
DATE OF SERVICE:  5/26/2020     PREOPERATIVE DIAGNOSES:  Refractory postmenopausal bleeding; intrauterine mass;   pelvic pressure, pain; mixed urinary incontinence; pelvic floor relaxation.     POSTOPERATIVE DIAGNOSES:  Same; final pathology pending     PROCEDURE:  Laparoscopic-assisted vaginal hysterectomy, bilateral    salpingo-oophorectomy, anterior and posterior vaginal repair, enterocele    repair, perineoplasty, sacrospinous vault suspension, transobturator tape mid    urethral sling procedure, cystoscopy.     SURGEON:  Camron Longo MD     ASSISTANT:  Torsten Velarde MD     ANESTHESIA:  General     ANESTHESIOLOGIST: Anesthesiologist: Moo Smalls M.D.     ESTIMATED BLOOD LOSS FOR THE PROCEDURE:  100 mL.     FINDINGS:  Small normal appearing uterus with normal appearing adnexa and pelvis to laparoscopic visualizations, good support of the anterior and posterior vaginal walls in addition to apical vaginal tissue without any undue tension in the suture sites.  Cystoscopy revealed bilateral ureteral efflux, normal bladder, no undue tension noted at the level of the mid urethra after sling placement on cystoscopic evaluation.     DESCRIPTION OF PROCEDURE:  The patient was taken to the operating room where    general anesthesia was performed without difficulty.  The patient was then    prepped and draped in usual sterile fashion with lower extremities placed in    Michel stirrups.  Attention was first turned to the perineum where a weighted    speculum was placed with the use of Marroquin retractor.  Single tooth tenaculum    was placed on the anterior lip of the cervix.  Hulka uterine manipulator was    then placed.  Single tooth tenaculum and retractor was then removed.     Attention was then turned to the umbilicus where a 1 cm incision was made.  A    Veress needle was placed, 3.5 L of carboperitoneum were then obtained.  A 10    mm trocar port was then placed.  Two separate ports were placed approximately    1/3  of the way from the anterior supra iliac spine lateral to the rectus    muscle under direct laparoscopic visualization with 5 mm ports placed.     Attention was then turned to the pelvis where the distal portion of the    fallopian tubes was then grasped just below the ovary.  This area was grasped,   cauterized, and transected at the level of the infundibulopelvic ligament.     Once the ureters were noted to be coursing far inferior to the operative    field, the broad round main portion of broad ligaments were then isolated,    cauterized, and transected on each side.  The vesicouterine peritoneum was    grasped, incised, the bladder flap was created.  Uterine vessels were then    clamped, cauterized and transected on each side.  Attention was then turned to   the perineum where a weighted speculum was placed with the use of Marroquin    retractor, a thyroid tenaculum was placed on the anterior lip, one on the    posterior lips of the cervix.  Cervix was then injected with 10 mL of 0.25%    Marcaine with epinephrine in a circumferential fashion starting anteriorly,    proceeding posterior, proceeding back anterior once again.  The bladder was    pushed anterior and cephalad.  The anterior cul-de-sac was entered sharply.     Right angle Karel retractor was placed upon sharp entry in the anterior    cul-de-sac.  A large duckbill speculum was placed upon sharp entry in the    posterior cul-de-sac.  Uterosacral cardinal complex was then grasped with ZED    clamps, transected and suture ligated with 0 Vicryl suture bilaterally.  The    uterus, both fallopian tubes then handed off the field as specimen.  Excellent   hemostasis was noted.  The anterior and posterior leafs of the peritoneum in    addition to the uterosacral cardinal complexes were reapproximated in the    midline in a pursestring suture using 2-0 Vicryl.  The vaginal cuff was then    reapproximated with figure-of-eight sutures using 0 Vicryl reapproximating     both uterosacral cardinal complexes of the respective vaginal apices.  Once    the vaginal cuff was closed, the anterior vaginal mucosa was then injected    with 10 mL of 0.25% Marcaine with epinephrine.  An incision was made in the    anterior vaginal mucosa.  The vaginal mucosa was then dissected off the    underlying pubocervical fascia camden until the levator muscles were then    reached.  Horizontal mattress sutures were then used to reapproximate the    pubocervical fascia in the midline in a double 0 closure, thereby reducing the   midline cystocele.  A 10 mL of 0.25% Marcaine with epinephrine were injected    with 5 mL on the right, 5 mL on the left lateral to the clitoris labia crural    fold followed by stab incision made with the use 11 blade scalpel followed by    the placement of the Obtryx halo needles through the labial crural incision    sites through the obturator membrane out through the vaginal mucosal incision    at the level of the mid urethra.  The sling was then applied to the Obtryx    halo needle.  Needle was then taken back up through their original track.     Tensioning of position was then performed.  The Raines catheter was removed,    which had been placed at the beginning of the case for placement of 30-degree    cystoscope, which revealed normal urinary bladder, bilateral ureteral efflux    and no undue tension noted at the level of the mid urethra.  The sling was    then released under direct cystoscopic evaluation.  Tensioning of position was   then finalized.  All excess vaginal mucosa and sling material was then    excised, cystoscope removed.  Raines catheter replaced.  The vaginal mucosa was   then reapproximated with 2-0 Vicryl in running locking fashion in one    segment.  Posterior vaginal mucosa was then injected with 10 mL of 0.25%    Marcaine with epinephrine.  The vaginal mucosa was then dissected off the    underlying rectovaginal fascia camden until the levator muscles  were then    reached.  Dissection on the sacrospinous processes ischial spine was then    performed on the right, 3 cm medial along the sacrospinous ligament with    straight catheterization needle device, 0 Ethibond suture was taken through    the sacrospinous ligament taken out through the apical portion of the vaginal    cuff on the overlying vaginal mucosa.  Once tied down, there was good    reapproximation of the cuff to the sacrospinous ligament.  The rectovaginal    septum was then reapproximated to the posterior aspect of the vaginal cuff to    reduce a large enterocele located at the superior aspect of dissection in a    double pursestring suture.  Horizontal mattress sutures were then used to    reapproximate the rectovaginal fascia in the midline in a double 0 closure,    thereby reducing the midline rectocele.  Excess vaginal mucosa was then    trimmed.  The vaginal mucosa was then reapproximated with 2-0 Vicryl in    running locking fashion in one segment for a perineoplasty on the perineum.     Attention was then turned back up to the abdomen and pelvis.  Pelvis was    inspected and noted to be hemostatic, 3.5 L of carboperitoneum removed    followed by removal of the ports under direct laparoscopic visualization after placing Surgiflow over the vaginal cuff given her desire to proceed with bloodless surgery.  The   incisions were then reapproximated with single interrupted sutures using 4-0    Vicryl, injected with 20 mL of 0.25% Marcaine with epinephrine.  The patient    tolerated the procedure well and was awoken from general anesthesia, was taken   to recovery in stable condition.        ____________________________________     MERNA FLOYD MD

## 2020-05-26 NOTE — OR NURSING
Pt enrolled in bloodless program,  Bloodless consented signed and on chart, paragraph #5 crossed off and initialed.  Bloodless arm band on pt and sticker on front of chart.

## 2020-05-26 NOTE — ANESTHESIA TIME REPORT
Anesthesia Start and Stop Event Times     Date Time Event    5/26/2020 0648 Ready for Procedure     0726 Anesthesia Start     0932 Anesthesia Stop        Responsible Staff  05/26/20    Name Role Begin End    Moo Smalls M.D. Anesth 0748 0932        Preop Diagnosis (Free Text):  Pre-op Diagnosis     POSTMENOPAUSAL BLEEDING, PELVIC PAIN, STRESS URINARY INCONTINENCE, CYSTOCELE        Preop Diagnosis (Codes):    Post op Diagnosis  Abnormal uterine bleeding      Premium Reason  Non-Premium    Comments:

## 2020-05-26 NOTE — OR NURSING
0930- Pt to PACU from OR. Report from anesthesia and OR RN. 6L O2 via mask. Respirations even and unlabored. VSS. Raines catheter in place. 3 Lab incisions, CDI. SCD's placed on legs.     0935- pt titrated to room air    0950- pt medicated with IV and oral pain medication    0955-pt placed on 1L O2 via NC, tolerating water    1009-pt medicated with IV pain medication    1013-Pt resting, appears comfortable. Respirations even and unlabored. No needs at this time.     1040- pt medicated with IV pain medication    1112- pt titrated to 1L O2.     1125- pt titrated to room air.    1148- pt meets criteria for phase 2. Report to Elin GARBER RN. Pt moved to PACU2.

## 2020-05-26 NOTE — OR NURSING
1151 Pt to PACU II phase I. Report from RNMila. Pt on RA, encouraging deep breathing. Respirations even and unlabored. VSS. Lap sites x3 CDI, changed shannon pad, minimal old blood noted.     1240 Reviewed dc instructions, bearden cath care, and lovenox injections with pt, will also review via phone with son.     1300 Reviewed instructions over phone with sister in law, verbalizes understanding.    1312 Discharge orders received. Meets discharge criteria at this time. Tolerable pain. No nausea. Tolerating PO. On RA. All lines and monitors discontinued. Reviewed discharge paperwork with pt previously and sister in law over phone. Discussed diet, activity, medications, follow up care and worsening symptoms. No questions at this time. Pt to be discharged to home via private vehicle. Pt escorted out of department in wheelchair with CNA, and all belongings.

## 2020-05-26 NOTE — DISCHARGE INSTRUCTIONS
ACTIVITY: Rest and take it easy for the first 24 hours.  A responsible adult is recommended to remain with you during that time.  It is normal to feel sleepy.  We encourage you to not do anything that requires balance, judgment or coordination.    MILD FLU-LIKE SYMPTOMS ARE NORMAL. YOU MAY EXPERIENCE GENERALIZED MUSCLE ACHES, THROAT IRRITATION, HEADACHE AND/OR SOME NAUSEA.    FOR 24 HOURS DO NOT:  Drive, operate machinery or run household appliances.  Drink beer or alcoholic beverages.   Make important decisions or sign legal documents.    SPECIAL INSTRUCTIONS: Go to MD office to have bearden catheter removed tomorrow morning between 9-11am.     DIET: To avoid nausea, slowly advance diet as tolerated, avoiding spicy or greasy foods for the first day.  Add more substantial food to your diet according to your physician's instructions.  Babies can be fed formula or breast milk as soon as they are hungry.  INCREASE FLUIDS AND FIBER TO AVOID CONSTIPATION.    SURGICAL DRESSING/BATHING: Ok to shower tomorrow after catheter removed. No tub baths, hot tubs or swimming until cleared by your physician    FOLLOW-UP APPOINTMENT:  A follow-up appointment should be arranged with your doctor; call to schedule.    You should CALL YOUR PHYSICIAN if you develop:  Fever greater than 101 degrees F.  Pain not relieved by medication, or persistent nausea or vomiting.  Excessive bleeding (blood soaking through dressing) or unexpected drainage from the wound.  Extreme redness or swelling around the incision site, drainage of pus or foul smelling drainage.  Inability to urinate or empty your bladder within 8 hours.  Problems with breathing or chest pain.    You should call 911 if you develop problems with breathing or chest pain.  If you are unable to contact your doctor or surgical center, you should go to the nearest emergency room or urgent care center.  Physician's telephone #: [phone]    If any questions arise, call your doctor.   If your doctor is not available, please feel free to call the Surgical Center at [phone].  The Center is open Monday through Friday from 7AM to 7PM.  You can also call the HEALTH HOTLINE open 24 hours/day, 7 days/week and speak to a nurse at [phone], or toll free at [phone].    A registered nurse may call you a few days after your surgery to see how you are doing after your procedure.    MEDICATIONS: Resume taking daily medication.  Take prescribed pain medication with food.  If no medication is prescribed, you may take non-aspirin pain medication if needed.  PAIN MEDICATION CAN BE VERY CONSTIPATING.  Take a stool softener or laxative such as senokot, pericolace, or milk of magnesia if needed.    Prescription given for Received prior to surgery.  Last pain medication given at 9:50am- Ok to take Norco after 1:50 pm.    Begin Lovenox injections     If your physician has prescribed pain medication that includes Acetaminophen (Tylenol), do not take additional Acetaminophen (Tylenol) while taking the prescribed medication.    Enoxaparin injection  What is this medicine?  ENOXAPARIN (ee nox a PA rin) is used after knee, hip, or abdominal surgeries to prevent blood clotting. It is also used to treat existing blood clots in the lungs or in the veins.  This medicine may be used for other purposes; ask your health care provider or pharmacist if you have questions.  COMMON BRAND NAME(S): Lovenox  What should I tell my health care provider before I take this medicine?  They need to know if you have any of these conditions:  -bleeding disorders, hemorrhage, or hemophilia  -infection of the heart or heart valves  -kidney or liver disease  -previous stroke  -prosthetic heart valve  -recent surgery or delivery of a baby  -ulcer in the stomach or intestine, diverticulitis, or other bowel disease  -an unusual or allergic reaction to enoxaparin, heparin, pork or pork products, other medicines, foods, dyes, or  preservatives  -pregnant or trying to get pregnant  -breast-feeding  How should I use this medicine?  This medicine is for injection under the skin. It is usually given by a health-care professional. You or a family member may be trained on how to give the injections. If you are to give yourself injections, make sure you understand how to use the syringe, measure the dose if necessary, and give the injection. To avoid bruising, do not rub the site where this medicine has been injected. Do not take your medicine more often than directed. Do not stop taking except on the advice of your doctor or health care professional.  Make sure you receive a puncture-resistant container to dispose of the needles and syringes once you have finished with them. Do not reuse these items. Return the container to your doctor or health care professional for proper disposal.  Talk to your pediatrician regarding the use of this medicine in children. Special care may be needed.  Overdosage: If you think you have taken too much of this medicine contact a poison control center or emergency room at once.  NOTE: This medicine is only for you. Do not share this medicine with others.  What if I miss a dose?  If you miss a dose, take it as soon as you can. If it is almost time for your next dose, take only that dose. Do not take double or extra doses.  What may interact with this medicine?  -aspirin and aspirin-like medicines  -certain medicines that treat or prevent blood clots  -dipyridamole  -NSAIDs, medicines for pain and inflammation, like ibuprofen or naproxen  This list may not describe all possible interactions. Give your health care provider a list of all the medicines, herbs, non-prescription drugs, or dietary supplements you use. Also tell them if you smoke, drink alcohol, or use illegal drugs. Some items may interact with your medicine.  What should I watch for while using this medicine?  Visit your doctor or health care professional  for regular checks on your progress. Your condition will be monitored carefully while you are receiving this medicine.  Notify your doctor or health care professional and seek emergency treatment if you develop breathing problems; changes in vision; chest pain; severe, sudden headache; pain, swelling, warmth in the leg; trouble speaking; sudden numbness or weakness of the face, arm, or leg. These can be signs that your condition has gotten worse.  If you are going to have surgery, tell your doctor or health care professional that you are taking this medicine.  Do not stop taking this medicine without first talking to your doctor. Be sure to refill your prescription before you run out of medicine.  Avoid sports and activities that might cause injury while you are using this medicine. Severe falls or injuries can cause unseen bleeding. Be careful when using sharp tools or knives. Consider using an electric razor. Take special care brushing or flossing your teeth. Report any injuries, bruising, or red spots on the skin to your doctor or health care professional.  What side effects may I notice from receiving this medicine?  Side effects that you should report to your doctor or health care professional as soon as possible:  -allergic reactions like skin rash, itching or hives, swelling of the face, lips, or tongue  -feeling faint or lightheaded, falls  -signs and symptoms of bleeding such as bloody or black, tarry stools; red or dark-brown urine; spitting up blood or brown material that looks like coffee grounds; red spots on the skin; unusual bruising or bleeding from the eye, gums, or nose  Side effects that usually do not require medical attention (report to your doctor or health care professional if they continue or are bothersome):  -pain, redness, or irritation at site where injected  This list may not describe all possible side effects. Call your doctor for medical advice about side effects. You may report side  effects to FDA at 7-338-FDA-5263.  Where should I keep my medicine?  Keep out of the reach of children.  Store at room temperature between 15 and 30 degrees C (59 and 86 degrees F). Do not freeze. If your injections have been specially prepared, you may need to store them in the refrigerator. Ask your pharmacist. Throw away any unused medicine after the expiration date.  NOTE: This sheet is a summary. It may not cover all possible information. If you have questions about this medicine, talk to your doctor, pharmacist, or health care provider.  © 2018 [employer]vier/Gold Standard (2015-04-21 16:06:21)      Depression / Suicide Risk    As you are discharged from this [facility] facility, it is important to learn how to keep safe from harming yourself.    Recognize the warning signs:  · Abrupt changes in personality, positive or negative- including increase in energy   · Giving away possessions  · Change in eating patterns- significant weight changes-  positive or negative  · Change in sleeping patterns- unable to sleep or sleeping all the time   · Unwillingness or inability to communicate  · Depression  · Unusual sadness, discouragement and loneliness  · Talk of wanting to die  · Neglect of personal appearance   · Rebelliousness- reckless behavior  · Withdrawal from people/activities they love  · Confusion- inability to concentrate     If you or a loved one observes any of these behaviors or has concerns about self-harm, here's what you can do:  · Talk about it- your feelings and reasons for harming yourself  · Remove any means that you might use to hurt yourself (examples: pills, rope, extension cords, firearm)  · Get professional help from the community (Mental Health, Substance Abuse, psychological counseling)  · Do not be alone:Call your Safe Contact- someone whom you trust who will be there for you.  · Call your local CRISIS HOTLINE 632-4831 or 326-675-3841  · Call your local Children's Mobile Crisis Response Team  [facility] (448) 804-5289 or www.SnapLogic.Synappio  · Call the toll free National Suicide Prevention Hotlines   · National Suicide Prevention Lifeline 713-326-VYGA (3523)  · National Hope Line Network 800-SUICIDE (923-7562)

## 2020-05-26 NOTE — OR NURSING
COVID-19 Pre-surgery screening:  ?  1. Do you have an undiagnosed respiratory illness or symptoms such as coughing or sneezing? No      2. Do you have an unexplained fever greater than 100.4 degrees Fahrenheit or 38 degrees Celsius? No     3. Have you had direct exposure to a patient who tested positive for Covid-19? No  ?  4. Have you traveled within the last 14 days to [place], [place], [place], [place], or [place]? No    ?  Informed of no visitor policy

## 2020-05-26 NOTE — OR SURGEON
Immediate Post OP Note    PreOp Diagnosis: Refractory postmenopausal bleeding; intrauterine mass;   pelvic pressure, pain; mixed urinary incontinence; pelvic floor relaxation.    PostOp Diagnosis: Same; final pathology pending    Procedure(s):  HYSTERECTOMY, TOTAL, VAGINAL, LAPAROSCOPY-ASSISTED - Wound Class: Clean  SALPINGO-OOPHORECTOMY - Wound Class: Clean  COLPORRHAPHY, COMBINED ANTEROPOSTERIOR - W/PERINEOPLASTY - Wound Class: Clean Contaminated  REPAIR, ENTEROCELE - Wound Class: Clean Contaminated  BLADDER SLING, FEMALE - TOT - Wound Class: Clean Contaminated  COLPOPEXY - SACROSPINOUS VAULT SUSPENSION - Wound Class: Clean Contaminated    Surgeon(s):  ISAURA Vazquez M.D.    Anesthesiologist/Type of Anesthesia:  Anesthesiologist: Moo Smalls M.D./General    Surgical Staff:  Circulator: Linnette Tang R.N.  Relief Circulator: Sarahi Gutierres R.N.  Relief Scrub: Ingrid Guo  Scrub Person: Renay Gama    Specimens removed if any:  ID Type Source Tests Collected by Time Destination   A : cervix, uterus, bilateral fallopian tubes, bilateral ovaries Tissue Uterus PATHOLOGY SPECIMEN Camron Longo M.D. 5/26/2020  8:05 AM        Estimated Blood Loss: 100ccs    Findings: Small normal appearing uterus with normal appearing adnexa and pelvis to laparoscopic visualization, good support of the anterior and the posterior and the apical vaginal tissue without any undue tension at any suture sites, cystoscopy revealed bilateral ureteral efflux, normal bladder and no undue tension at the mid urethra after sling placement.     Complications: None        5/26/2020 9:25 AM Camron Longo M.D.

## 2020-05-26 NOTE — ANESTHESIA PROCEDURE NOTES
Airway    Date/Time: 5/26/2020 7:52 AM  Performed by: Moo Smalls M.D.  Authorized by: Moo Smalls M.D.     Location:  OR  Urgency:  Elective  Indications for Airway Management:  Anesthesia      Spontaneous Ventilation: absent    Sedation Level:  Deep  Preoxygenated: Yes    Patient Position:  Sniffing  Final Airway Type:  Endotracheal airway  Final Endotracheal Airway:  ETT  Cuffed: Yes    Technique Used for Successful ETT Placement:  Video laryngoscopy    Insertion Site:  Oral  Blade Type:  Harini  Laryngoscope Blade/Videolaryngoscope Blade Size:  3  ETT Size (mm):  7.0  Measured from:  Teeth  ETT to Teeth (cm):  22  Placement Verified by: auscultation and capnometry    Cormack-Lehane Classification:  Grade I - full view of glottis  Number of Attempts at Approach:  1

## 2020-05-26 NOTE — ANESTHESIA QCDR
2019 [facility] Clinical Data Registry (for Quality Improvement)     Postoperative nausea/vomiting risk protocol (Adult = 18 yrs and Pediatric 3-17 yrs)- (430 and 463)  General inhalation anesthetic (NOT TIVA) with PONV risk factors: Yes  Provision of anti-emetic therapy with at least 2 different classes of agents: Yes   Patient DID NOT receive anti-emetic therapy and reason is documented in Medical Record:  N/A    Multimodal Pain Management- (477)  Non-emergent surgery AND patient age >= 18: Yes  Use of Multimodal Pain Management, two or more drugs and/or interventions, NOT including systemic opioids:   Exception: Documented allergy to multiple classes of analgesics:     Smoking Abstinence (404)  Patient is current smoker (cigarette, pipe, e-cig, marijuanna): Yes  Elective Surgery:   Abstinence instructions provided prior to day of surgery:   Patient abstained from smoking on day of surgery:     Pre-Op Beta-Blocker in Isolated CABG (44)  Isolated CABG AND patient age >= 18:   Beta-blocker admin within 24 hours of surgical incision:   Exception:of medical reason(s) for not administering beta blocker within 24 hours prior to surgical incision (e.g., not  indicated,other medical reason):     PACU assessment of acute postoperative pain prior to Anesthesia Care End- Applies to Patients Age = 18- (ABG7)  Initial PACU pain score is which of the following: < 7/10  Patient unable to report pain score: N/A    Post-anesthetic transfer of care checklist/protocol to PACU/ICU- (426 and 427)  Upon conclusion of case, patient transferred to which of the following locations: PACU/Non-ICU  Use of transfer checklist/protocol: Yes  Exclusion: Service Performed in Patient Hospital Room (and thus did not require transfer): N/A  Unplanned admission to ICU related to anesthesia service up through end of PACU care- (MD51)  Unplanned admission to ICU (not initially anticipated at anesthesia start time): No

## 2020-05-26 NOTE — OR NURSING
Pt requests to have Leroy Marcanou as anesthesiologist for procedure tomorrow morning if he is on schedule.

## 2020-06-22 ENCOUNTER — TELEPHONE (OUTPATIENT)
Dept: VASCULAR LAB | Facility: MEDICAL CENTER | Age: 65
End: 2020-06-22

## 2020-06-22 NOTE — TELEPHONE ENCOUNTER
Spoke with patient to see if she was ready to reschedule Vascular follow up, patient states that she was recently diagnosed with cancer and would like to wait on scheduling a follow up apt here until she knows if she will be having more surgery. Patient will call back after her appt with Oncology.

## 2020-07-03 ENCOUNTER — HOSPITAL ENCOUNTER (OUTPATIENT)
Dept: LAB | Facility: MEDICAL CENTER | Age: 65
End: 2020-07-03
Attending: OBSTETRICS & GYNECOLOGY
Payer: MEDICARE

## 2020-07-03 LAB — CANCER AG125 SERPL-ACNC: 9.5 U/ML (ref 0–35)

## 2020-07-03 PROCEDURE — 36415 COLL VENOUS BLD VENIPUNCTURE: CPT

## 2020-07-03 PROCEDURE — 86304 IMMUNOASSAY TUMOR CA 125: CPT

## 2020-07-04 ENCOUNTER — HOSPITAL ENCOUNTER (OUTPATIENT)
Dept: RADIOLOGY | Facility: MEDICAL CENTER | Age: 65
End: 2020-07-04
Attending: OBSTETRICS & GYNECOLOGY
Payer: MEDICARE

## 2020-07-04 DIAGNOSIS — R10.2 ADNEXAL TENDERNESS, RIGHT: ICD-10-CM

## 2020-07-04 DIAGNOSIS — N95.0 POSTMENOPAUSAL BLEEDING: ICD-10-CM

## 2020-07-04 DIAGNOSIS — C54.1 ENDOMETRIAL SARCOMA (HCC): ICD-10-CM

## 2020-07-04 PROCEDURE — 700117 HCHG RX CONTRAST REV CODE 255: Performed by: OBSTETRICS & GYNECOLOGY

## 2020-07-04 PROCEDURE — 71260 CT THORAX DX C+: CPT

## 2020-07-04 RX ADMIN — IOHEXOL 100 ML: 350 INJECTION, SOLUTION INTRAVENOUS at 09:10

## 2020-07-04 RX ADMIN — IOHEXOL 25 ML: 240 INJECTION, SOLUTION INTRATHECAL; INTRAVASCULAR; INTRAVENOUS; ORAL at 09:10

## 2020-07-14 ENCOUNTER — HOSPITAL ENCOUNTER (OUTPATIENT)
Dept: RADIATION ONCOLOGY | Facility: MEDICAL CENTER | Age: 65
End: 2020-07-31
Attending: RADIOLOGY
Payer: MEDICARE

## 2020-07-14 ENCOUNTER — PATIENT OUTREACH (OUTPATIENT)
Dept: OTHER | Facility: MEDICAL CENTER | Age: 65
End: 2020-07-14

## 2020-07-14 VITALS
BODY MASS INDEX: 33.26 KG/M2 | TEMPERATURE: 97.6 F | WEIGHT: 193.78 LBS | HEART RATE: 105 BPM | DIASTOLIC BLOOD PRESSURE: 92 MMHG | OXYGEN SATURATION: 96 % | SYSTOLIC BLOOD PRESSURE: 124 MMHG

## 2020-07-14 DIAGNOSIS — C54.1 ENDOMETRIAL CANCER (HCC): ICD-10-CM

## 2020-07-14 DIAGNOSIS — F41.9 ANXIETY: ICD-10-CM

## 2020-07-14 PROCEDURE — 99205 OFFICE O/P NEW HI 60 MIN: CPT | Performed by: RADIOLOGY

## 2020-07-14 PROCEDURE — 99214 OFFICE O/P EST MOD 30 MIN: CPT | Performed by: RADIOLOGY

## 2020-07-14 RX ORDER — ALPRAZOLAM 0.5 MG/1
0.5 TABLET ORAL NIGHTLY PRN
Qty: 6 TAB | Refills: 0 | Status: SHIPPED | OUTPATIENT
Start: 2020-07-14 | End: 2020-07-20

## 2020-07-14 ASSESSMENT — PAIN SCALES - GENERAL: PAINLEVEL: 5=MODERATE PAIN

## 2020-07-14 ASSESSMENT — FIBROSIS 4 INDEX: FIB4 SCORE: 1.16

## 2020-07-14 NOTE — NON-PROVIDER
Patient was seen today in clinic with Dr. Martinez for consultation.  Vitals signs and weight were obtained and pain assessment was completed.  Allergies and medications were reviewed with the patient.  Review of systems completed.     Vitals/Pain:  Vitals:    07/14/20 1046   BP: 124/92   Pulse: (!) 105   Temp: 36.4 °C (97.6 °F)   SpO2: 96%   Weight: 87.9 kg (193 lb 12.6 oz)   Pain Score: 5=Moderate Pain        Allergies:   Bloodless; Macrodantin [kdc:red dye+yellow dye+ci pigment blue 63+nitrofurantoin]; Amlodipine; Lisinopril; and Pcn [penicillins]    Current Medications:  Current Outpatient Medications   Medication Sig Dispense Refill   • Cholecalciferol (DIALYVITE VITAMIN D 5000 PO) Take  by mouth every day.     • OMEPRAZOLE PO Take 20 mg by mouth.     • aspirin EC (ECOTRIN) 81 MG Tablet Delayed Response Take 81 mg by mouth every day.     • olmesartan-hydrochlorothiazide (BENICAR HCT) 40-12.5 MG per tablet Take 1 Tab by mouth every day. 30 Tab 11   • rosuvastatin (CRESTOR) 20 MG Tab Take 20 mg by mouth.  2     No current facility-administered medications for this encounter.          PCP:  Sarah Abbott, Med Ass't

## 2020-07-14 NOTE — CONSULTS
RADIATION ONCOLOGY CONSULT    DATE OF SERVICE: 2020    IDENTIFICATION: A 65 y.o. female with   Endometrial cancer (HCC)  Staging form: Corpus Uteri - Carcinoma and Carcinosarcoma, AJCC 8th Edition  - Pathologic stage from 2020: FIGO Stage IB, calculated as Stage Unknown (pT1b, pNX, cM0) - Signed by Irlanda MACHADO M.D. on 2020  Stage prefix: Initial diagnosis  Tumor size (mm): 18  Histologic grade (G): G1  Histologic grading system: 3 grade system  Lymph-vascular invasion (LVI): LVI not present (absent)/not identified  Residual tumor (R): R0 - None  Histopathologic type: Endometrioid adenocarcinoma, NOS  Diagnostic confirmation: Positive histology  Specimen type: Excision  Staged by: Managing physician  Depth of myometrial invasion (mm): 6  Number of pelvic nodes examined during dissection: 0  Lymph node metastasis: Unknown    She is here at the kind request of Dr. Fe Bentley for vaginal Brachytherapy.    HISTORY OF PRESENT ILLNESS: 65-year-old  2 para 1 AB 1 female who developed postmenopausal bleeding after being started on Xarelto for deep venous thrombosis.  The vaginal bleeding went on for approximately a year she had an vaginal ultrasound for work-up But was continued to be followed.  She then went for second opinion and underwent an endometrial biopsy On 2020 that demonstratedBenign-appearing endocervical glandular epithelium no obvious endometrial epithelium or endometrial stromal tissue was noted. She was given the option of hysteroscopy and biopsy versus hysterectomy.  Patient opted for hysterectomy and underwentTotal vaginal laparoscopic assisted hysterectomy and bilateral salpingo-oophorectomy on 2020.  Pathology demonstrated grade 1 endometrioid adenocarcinoma measuring 1.8cm.  Depth of invasion 6 mm total wall thickness 8 mm.No evidence of lymphovascular invasion.  No endocervical involvement.    She is now 7 weeks postop.  Denies vaginal bleeding.Saw Dr. Bentley in  GYN oncology follow-up.  Staging amanda dissection was recommended which patient currently has declined.  She has been referred for consideration of vaginal brachytherapy.    PROBLEM LIST:  Patient Active Problem List   Diagnosis   • Deep vein thrombosis (HCC) [I82.409]   • History of DVT (deep vein thrombosis)   • Essential hypertension   • Cough due to ACE inhibitor   • Dyslipidemia   • Stress   • Primary insomnia   • Endometrial cancer (HCC)        PAST SURGICAL HISTORY:  Past Surgical History:   Procedure Laterality Date   • PB COMBINED ANT/POST COLPORRHAPHY N/A 5/26/2020    Procedure: COLPORRHAPHY, COMBINED ANTEROPOSTERIOR - W/PERINEOPLASTY;  Surgeon: Camron Longo M.D.;  Location: SURGERY SAME DAY [facility];  Service: Gynecology   • VAGINAL HYSTERECTOMY SCOPE TOTAL N/A 5/26/2020    Procedure: HYSTERECTOMY, TOTAL, VAGINAL, LAPAROSCOPY-ASSISTED;  Surgeon: Camron Longo M.D.;  Location: SURGERY SAME DAY [facility];  Service: Gynecology   • SALPINGO OOPHORECTOMY Bilateral 5/26/2020    Procedure: SALPINGO-OOPHORECTOMY;  Surgeon: Camron Longo M.D.;  Location: SURGERY SAME DAY [facility];  Service: Gynecology   • ENTEROCELE REPAIR N/A 5/26/2020    Procedure: REPAIR, ENTEROCELE;  Surgeon: Camron Longo M.D.;  Location: SURGERY SAME DAY [facility];  Service: Gynecology   • BLADDER SLING FEMALE N/A 5/26/2020    Procedure: BLADDER SLING, FEMALE - TOT;  Surgeon: Camron Longo M.D.;  Location: SURGERY SAME DAY [facility];  Service: Gynecology   • VAGINAL SUSPENSION N/A 5/26/2020    Procedure: COLPOPEXY - SACROSPINOUS VAULT SUSPENSION;  Surgeon: Camron Longo M.D.;  Location: SURGERY SAME DAY [facility];  Service: Gynecology   • PB HYSTEROSCOPY,DX,SEP PROC  4/2/2020    Procedure: HYSTEROSCOPY, DIAGNOSTIC;  Surgeon: Camron Longo M.D.;  Location: [facility];  Service: Gynecology   • DILATION AND CURETTAGE  4/2/2020    Procedure: DILATION AND CURETTAGE;  Surgeon: Camron Longo  M.D.;  Location: SURGERY [facility];  Service: Gynecology       CURRENT MEDICATIONS:  Current Outpatient Medications   Medication Sig Dispense Refill   • ALPRAZolam (XANAX) 0.5 MG Tab Take 1 Tab by mouth at bedtime as needed for Sleep for up to 6 days. 6 Tab 0   • Cholecalciferol (DIALYVITE VITAMIN D 5000 PO) Take  by mouth every day.     • OMEPRAZOLE PO Take 20 mg by mouth.     • aspirin EC (ECOTRIN) 81 MG Tablet Delayed Response Take 81 mg by mouth every day.     • olmesartan-hydrochlorothiazide (BENICAR HCT) 40-12.5 MG per tablet Take 1 Tab by mouth every day. 30 Tab 11   • rosuvastatin (CRESTOR) 20 MG Tab Take 20 mg by mouth.  2     No current facility-administered medications for this encounter.        ALLERGIES:    Bloodless; Macrodantin [kdc:red dye+yellow dye+ci pigment blue 63+nitrofurantoin]; Amlodipine; Lisinopril; and Pcn [penicillins]    FAMILY HISTORY:    family history includes Cancer in her father and sister; Colon Cancer in her sister; Dementia in her brother and mother; Heart Disease in her mother; Lung Cancer in her father; Other in her brother and paternal grandfather; Other (age of onset: 80) in her mother.    SOCIAL HISTORY:     reports that she has never smoked. She has never used smokeless tobacco. She reports current alcohol use. She reports that she does not use drugs.    REVIEW OF SYSTEMS:  A review of systems for today's date of service was reviewed and uploaded into the electronic medical record.    Review of Systems - Oncology    PHYSICAL EXAM:    ECOG PERFORMANCE STATUS:  ECOG Performance Review 7/14/2020   ECOG Performance Status Restricted in physically strenuous activity but ambulatory and able to carry out work of a light or sedentary nature, e.g., light house work, office work   Some recent data might be hidden     Karnofsky Score 7/14/2020   Karnofsky Score 70   Some recent data might be hidden     /92   Pulse (!) 105   Temp 36.4 °C (97.6 °F)   Wt 87.9 kg (193 lb  12.6 oz)   SpO2 96%   BMI 33.26 kg/m²   Physical Exam  Vitals signs and nursing note reviewed. Exam conducted with a chaperone present.   Constitutional:       General: She is not in acute distress.     Appearance: She is well-developed.   HENT:      Head: Normocephalic.   Eyes:      Conjunctiva/sclera: Conjunctivae normal.      Pupils: Pupils are equal, round, and reactive to light.   Neck:      Musculoskeletal: Normal range of motion and neck supple.   Cardiovascular:      Rate and Rhythm: Normal rate and regular rhythm.      Heart sounds: Normal heart sounds.   Pulmonary:      Effort: Pulmonary effort is normal.      Breath sounds: Normal breath sounds.   Abdominal:      General: Bowel sounds are normal.      Palpations: Abdomen is soft.   Genitourinary:     General: Normal vulva.      Exam position: Lithotomy position.      Vagina: Normal.      Comments: Vaginal cuff intact.  Patient complained of a lot of tenderness, Exam was aborted before gold fiducial markers could be placed to aid in vaginal brachytherapy.  Musculoskeletal: Normal range of motion.         General: No tenderness or deformity.   Lymphadenopathy:      Cervical: No cervical adenopathy.   Skin:     General: Skin is warm and dry.      Findings: No erythema.   Neurological:      Mental Status: She is alert and oriented to person, place, and time.      Cranial Nerves: No cranial nerve deficit.      Coordination: Coordination normal.   Psychiatric:         Behavior: Behavior normal.         Thought Content: Thought content normal.         Judgment: Judgment normal.          LABORATORY DATA:   Lab Results   Component Value Date/Time    SODIUM 139 05/21/2020 02:51 PM    POTASSIUM 4.0 05/21/2020 02:51 PM    CHLORIDE 103 05/21/2020 02:51 PM    CO2 21 05/21/2020 02:51 PM    GLUCOSE 113 (H) 05/21/2020 02:51 PM    BUN 22 05/21/2020 02:51 PM    CREATININE 1.08 05/21/2020 02:51 PM       Lab Results   Component Value Date/Time    WBC 8.5 05/21/2020 02:51 PM     RBC 3.93 (L) 05/21/2020 02:51 PM    HEMOGLOBIN 12.7 05/21/2020 02:51 PM    HEMATOCRIT 39.6 05/21/2020 02:51 PM    .8 (H) 05/21/2020 02:51 PM    MCH 32.3 05/21/2020 02:51 PM    MCHC 32.1 (L) 05/21/2020 02:51 PM    RDW 48.3 05/21/2020 02:51 PM    PLATELETCT 261 05/21/2020 02:51 PM    MPV 11.0 05/21/2020 02:51 PM    NEUTSPOLYS 58.60 04/01/2019 04:37 PM    LYMPHOCYTES 31.50 04/01/2019 04:37 PM    MONOCYTES 7.00 04/01/2019 04:37 PM    EOSINOPHILS 1.90 04/01/2019 04:37 PM    BASOPHILS 0.60 04/01/2019 04:37 PM          RADIOLOGY DATA:  Ct-chest,abdomen,pelvis With    Result Date: 7/6/2020  Surgical absence of the uterus. Small free fluid collections within the pelvis possibly postoperative. No pelvic mass identified. No evidence of metastatic disease within the chest, abdomen, and pelvis. Hepatic steatosis. 1.4 cm right middle cyst unchanged. Diverticula colon without evidence diverticulitis.      IMPRESSION:    A 65 y.o. with  Endometrial cancer (HCC)  Staging form: Corpus Uteri - Carcinoma and Carcinosarcoma, AJCC 8th Edition  - Pathologic stage from 7/14/2020: FIGO Stage IB, calculated as Stage Unknown (pT1b, pNX, cM0) - Signed by Irlanda MACHADO M.D. on 7/14/2020  Stage prefix: Initial diagnosis  Tumor size (mm): 18  Histologic grade (G): G1  Histologic grading system: 3 grade system  Lymph-vascular invasion (LVI): LVI not present (absent)/not identified  Residual tumor (R): R0 - None  Histopathologic type: Endometrioid adenocarcinoma, NOS  Diagnostic confirmation: Positive histology  Specimen type: Excision  Staged by: Managing physician  Depth of myometrial invasion (mm): 6  Number of pelvic nodes examined during dissection: 0  Lymph node metastasis: Unknown        RECOMMENDATIONS:   Stage Ib endometrioid adenocarcinoma, grade 1,Intermediate risk, recommended adjuvant vaginal brachii therapy to minimize vaginal cuff recurrence.  Her risk is approximately 15% decreasing to less than 5% with  brachytherapy.Treatments would involve delivering 5 intracavitary treatments delivering 2750 cGy depth of 5 mm over 2.5 weeks.  Technical aspects benefits risks were reviewed with the patient she understands and wishes to proceed.    She will return for simulation on July 23 at which point we will reattempt gold fiducial marker placement prior to simulation. 0.5 mg of Xanax have been provided to help with her anxiety. Treatments will commence on July 28 and complete by August 11.    Thank you for the opportunity to participate in her care.  If any questions or comments, please do not hesitate in calling.    Orders Placed This Encounter   • ALPRAZolam (XANAX) 0.5 MG Tab

## 2020-07-14 NOTE — PROGRESS NOTES
Telephone call to pt to discuss navigation and barriers to care.  No answer, left message requesting a return call.

## 2020-07-22 ENCOUNTER — PATIENT OUTREACH (OUTPATIENT)
Dept: CASE MANAGEMENT | Facility: MEDICAL CENTER | Age: 65
End: 2020-07-22
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
## 2020-07-23 ENCOUNTER — HOSPITAL ENCOUNTER (OUTPATIENT)
Dept: RADIATION ONCOLOGY | Facility: MEDICAL CENTER | Age: 65
End: 2020-07-23
Payer: MEDICARE

## 2020-07-23 PROCEDURE — 77290 THER RAD SIMULAJ FIELD CPLX: CPT | Performed by: RADIOLOGY

## 2020-07-23 PROCEDURE — 77470 SPECIAL RADIATION TREATMENT: CPT | Performed by: RADIOLOGY

## 2020-07-23 PROCEDURE — 77470 SPECIAL RADIATION TREATMENT: CPT | Mod: 26 | Performed by: RADIOLOGY

## 2020-07-23 PROCEDURE — 77263 THER RADIOLOGY TX PLNG CPLX: CPT | Performed by: RADIOLOGY

## 2020-07-23 PROCEDURE — 77290 THER RAD SIMULAJ FIELD CPLX: CPT | Mod: 26 | Performed by: RADIOLOGY

## 2020-07-23 PROCEDURE — 49411 INS MARK ABD/PEL FOR RT PERQ: CPT | Performed by: RADIOLOGY

## 2020-07-23 PROCEDURE — 77334 RADIATION TREATMENT AID(S): CPT | Mod: 26 | Performed by: RADIOLOGY

## 2020-07-23 PROCEDURE — 77332 RADIATION TREATMENT AID(S): CPT | Performed by: RADIOLOGY

## 2020-07-23 PROCEDURE — A4648 IMPLANTABLE TISSUE MARKER: HCPCS | Performed by: RADIOLOGY

## 2020-07-23 PROCEDURE — 57156 INS VAG BRACHYTX DEVICE: CPT | Mod: XU | Performed by: RADIOLOGY

## 2020-07-23 PROCEDURE — 77334 RADIATION TREATMENT AID(S): CPT | Mod: XU | Performed by: RADIOLOGY

## 2020-07-23 PROCEDURE — 57156 INS VAG BRACHYTX DEVICE: CPT | Performed by: RADIOLOGY

## 2020-07-23 NOTE — PROCEDURES
DATE OF SERVICE: 7/23/2020    DIAGNOSIS:  Endometrial cancer (HCC)  Staging form: Corpus Uteri - Carcinoma and Carcinosarcoma, AJCC 8th Edition  - Pathologic stage from 7/14/2020: FIGO Stage IB, calculated as Stage Unknown (pT1b, pNX, cM0) - Signed by Irlanda MACHADO M.D. on 7/14/2020  Stage prefix: Initial diagnosis  Tumor size (mm): 18  Histologic grade (G): G1  Histologic grading system: 3 grade system  Lymph-vascular invasion (LVI): LVI not present (absent)/not identified  Residual tumor (R): R0 - None  Histopathologic type: Endometrioid adenocarcinoma, NOS  Diagnostic confirmation: Positive histology  Specimen type: Excision  Staged by: Managing physician  Depth of myometrial invasion (mm): 6  Number of pelvic nodes examined during dissection: 0  Lymph node metastasis: Unknown       PROCEDURE: Placement of Gold fiducial marker    Patient placed in stirrups and speculum exam performed to visualize cervix/vaginal cuff.  One pack of three preloaded gold fiducial markers was opened. Three preloaded gold marker needles were modified with sterile technique to create flange 0.5cm below tip with sterile micro pore tape that was wrapped around needle multiple time.    Needle inserted into cervix/cuff  middle, and left side with entry controlled by flange.   2 Gold markers inserted uneventfully.

## 2020-07-23 NOTE — RADIATION PLANNING NOTES
DATE OF SERVICE: 7/23/2020    DIAGNOSIS:  Endometrial cancer (HCC)  Staging form: Corpus Uteri - Carcinoma and Carcinosarcoma, AJCC 8th Edition  - Pathologic stage from 7/14/2020: FIGO Stage IB, calculated as Stage Unknown (pT1b, pNX, cM0) - Signed by Irlanda MACHADO M.D. on 7/14/2020  Stage prefix: Initial diagnosis  Tumor size (mm): 18  Histologic grade (G): G1  Histologic grading system: 3 grade system  Lymph-vascular invasion (LVI): LVI not present (absent)/not identified  Residual tumor (R): R0 - None  Histopathologic type: Endometrioid adenocarcinoma, NOS  Diagnostic confirmation: Positive histology  Specimen type: Excision  Staged by: Managing physician  Depth of myometrial invasion (mm): 6  Number of pelvic nodes examined during dissection: 0  Lymph node metastasis: Unknown       DATE OF SERVICE: 7/23/2020    CYLINDER DIAMETER: 3.0    MARKER:    [x] Gold    SIMULATION VAGINAL CYLINDER:    Patient previously had gold fiducial markers inserted into vaginal cuff. With patient supine and legs immobilized in Vac-Alfred in frog-legged position on CT table, I attempted inserting the largest diameter cylinder Into the vagina. If unsuccessful, I then decreased diameter of cylinder to find right diameter for patient anatomy. Cylinder held in place by special underwear with Velcro straps. CT images acquired and cylinder and marker relationship reviewed.  Images approved and transferred to brachyvision planning workstation.      I have personally reviewed the relevant data, performed the target localization, and determined all relevant factors for this patient’s simulation.

## 2020-07-23 NOTE — RADIATION PLANNING NOTES
PATIENT NAME Ary Matta   PRIMARY PHYSICIAN Franci Smith [id number]   REFERRING PHYSICIAN No ref. provider found 1955     DATE OF SERVICE: 7/23/2020    DIAGNOSIS:  Endometrial cancer (HCC)  Staging form: Corpus Uteri - Carcinoma and Carcinosarcoma, AJCC 8th Edition  - Pathologic stage from 7/14/2020: FIGO Stage IB, calculated as Stage Unknown (pT1b, pNX, cM0) - Signed by Irlanda MACHADO M.D. on 7/14/2020  Stage prefix: Initial diagnosis  Tumor size (mm): 18  Histologic grade (G): G1  Histologic grading system: 3 grade system  Lymph-vascular invasion (LVI): LVI not present (absent)/not identified  Residual tumor (R): R0 - None  Histopathologic type: Endometrioid adenocarcinoma, NOS  Diagnostic confirmation: Positive histology  Specimen type: Excision  Staged by: Managing physician  Depth of myometrial invasion (mm): 6  Number of pelvic nodes examined during dissection: 0  Lymph node metastasis: Unknown         SPECIAL TREATMENT PROCEDURE NOTE:  Considerable additional effort required in the management of this case because of administration of Brachytherapy, which may result in increased normal tissue toxicity and require greater effort in contouring and treatment because of greater precision.

## 2020-07-23 NOTE — RADIATION PLANNING NOTES
Clinical Treatment Planning Note    DATE OF SERVICE: 7/23/2020    DIAGNOSIS:  Endometrial cancer (HCC)  Staging form: Corpus Uteri - Carcinoma and Carcinosarcoma, AJCC 8th Edition  - Pathologic stage from 7/14/2020: FIGO Stage IB, calculated as Stage Unknown (pT1b, pNX, cM0) - Signed by Irlanda MACHADO M.D. on 7/14/2020  Stage prefix: Initial diagnosis  Tumor size (mm): 18  Histologic grade (G): G1  Histologic grading system: 3 grade system  Lymph-vascular invasion (LVI): LVI not present (absent)/not identified  Residual tumor (R): R0 - None  Histopathologic type: Endometrioid adenocarcinoma, NOS  Diagnostic confirmation: Positive histology  Specimen type: Excision  Staged by: Managing physician  Depth of myometrial invasion (mm): 6  Number of pelvic nodes examined during dissection: 0  Lymph node metastasis: Unknown         IMAGING REVIEWED:  [x] CT     [] MRI     [] PET/CT     [] BONE SCAN     [] MAMMO     [] OTHER      TREATMENT INTENT:   [x] CURATIVE     [] MAINTENANCE     []  PALLIATIVE      []  SUPPORTIVE     []  PROPHYLACTIC     [] BENIGN     []  CONSOLIDATIVE      [] DEFINITIVE   []  OLOGIMETASTATIC      LINE OF TREATMENT:  [x] ADJUVANT   [] DEFINITIVE   [] NEOADJUVANT   [] RE-TREATMENT      TECHNIQUE PLANNED:  [] IMRT   [] 3D   [] SBRT   [] SRS/SRT   [x] HDR   [] ELECTRON       IMRT JUSTIFICATION:  []   An immediately adjacent area has been previously irradiated and abutting portals must be established with high precision.    []  Dose escalation is planned to deliver radiation doses in excess of those commonly utilized for similar tumors with conventional treatment.    []  The target volume is concave or convex, and the critical normal tissues are within or around that convexity or concavity.    []  The target volume is in close proximity to critical structures that must be protected.    []  The volume of interest must be covered with narrow margins to adequately protect  immediately adjacent  structures.      FIELDS & BLOCKING:  [] COMPLEX BLOCKS     []  = 3 TX AREAS     []  ARCS     []  CUSTOM SHEILD        []  SIMPLE BLOCK      CHEMOTHERAPY:  []  CONCURRENT     []  INDUCTION     [] SEQUENTIAL     []  <30 DAYS FROM XRT      NOTES:  OAR CONSTRAINTS: (GUIDELINES ONLY NOT ABSOLUTE)   Target Prescribed Coverage   PTV 95% of PTV covered by 100% (cGy) of RX Dose     PTV 99% of PTV covered by 93% (cGy) of RX Dose       MARINO Goal   Rectum/Sigmoid V40Gy < 60%   Bladder V45Gy < 35%   R Femoral Head V30Gy < 15%   L Femoral Head V30Gy < 15%   Bowel (small & large) V40Gy < 30%   Individual Small Bowel Loops V15Gy < 120cc   Entire Cavity Small Bowel V45Gy < 195cc   *RTOG 0921, QUANTEC

## 2020-07-24 ENCOUNTER — PATIENT OUTREACH (OUTPATIENT)
Dept: OTHER | Facility: MEDICAL CENTER | Age: 65
End: 2020-07-24

## 2020-07-24 PROCEDURE — 77295 3-D RADIOTHERAPY PLAN: CPT | Mod: 26 | Performed by: RADIOLOGY

## 2020-07-24 PROCEDURE — 77295 3-D RADIOTHERAPY PLAN: CPT | Performed by: RADIOLOGY

## 2020-07-24 PROCEDURE — 77300 RADIATION THERAPY DOSE PLAN: CPT | Performed by: RADIOLOGY

## 2020-07-24 PROCEDURE — 77300 RADIATION THERAPY DOSE PLAN: CPT | Mod: 26 | Performed by: RADIOLOGY

## 2020-07-24 NOTE — PROGRESS NOTES
"Telephone call to pt.  Introduced myself and the NN role and provided contact information. Support & services offered.  Pt denied needs and questions at this time, stating she felt Dr. Martinez had answered her questions and explained things very well.  Enquired about transportation, she confirmed she has a ride, stating \"he wants me sedated\".  Confirmed with pt her upcoming radiation appointment.  Encouraged to call with questions or needs.   "

## 2020-07-28 ENCOUNTER — HOSPITAL ENCOUNTER (OUTPATIENT)
Dept: RADIATION ONCOLOGY | Facility: MEDICAL CENTER | Age: 65
End: 2020-07-28

## 2020-07-28 PROCEDURE — 57156 INS VAG BRACHYTX DEVICE: CPT | Performed by: RADIOLOGY

## 2020-07-28 PROCEDURE — 77280 THER RAD SIMULAJ FIELD SMPL: CPT | Mod: 26 | Performed by: RADIOLOGY

## 2020-07-28 PROCEDURE — 77280 THER RAD SIMULAJ FIELD SMPL: CPT | Performed by: RADIOLOGY

## 2020-07-28 PROCEDURE — C1717 BRACHYTX, NON-STR,HDR IR-192: HCPCS | Performed by: RADIOLOGY

## 2020-07-28 PROCEDURE — 77332 RADIATION TREATMENT AID(S): CPT | Performed by: RADIOLOGY

## 2020-07-28 PROCEDURE — 77770 HDR RDNCL NTRSTL/ICAV BRCHTX: CPT | Performed by: RADIOLOGY

## 2020-07-28 PROCEDURE — 77770 HDR RDNCL NTRSTL/ICAV BRCHTX: CPT | Mod: 26 | Performed by: RADIOLOGY

## 2020-07-28 NOTE — PROCEDURES
DATE OF SERVICE: 7/28/2020    TX NUMBER:  1    CYLINDER DIAMETER: 3.0    MARKER:    [x] Gold    SIMULATION VAGINAL CYLINDER:    Patient prior to immobilization on CT table was instructed to wear special brachytherapy underwear with Velcro straps designed to hold cylinder in place. In CT suite Vaginal cylinder of appropriate size previously determined at time of initial simulation placed into vagina.  Cylinder held in place by special underwear with Velcro straps. CT images acquired and cylinder and gold marker relationship reviewed.  Images approved.  Patient transferred to Brachytherapy suite and transfer catheter attached to HDR unit. Catheter length verified. Attached to HDR afterloader verified by myself and physicist.    Treatment delivered.       I have personally reviewed the relevant data, performed the target localization, and determined all relevant factors for this patient’s simulation.

## 2020-07-30 ENCOUNTER — HOSPITAL ENCOUNTER (OUTPATIENT)
Dept: RADIATION ONCOLOGY | Facility: MEDICAL CENTER | Age: 65
End: 2020-07-30
Payer: MEDICARE

## 2020-07-30 ENCOUNTER — PATIENT OUTREACH (OUTPATIENT)
Dept: OTHER | Facility: MEDICAL CENTER | Age: 65
End: 2020-07-30

## 2020-07-30 PROCEDURE — C1717 BRACHYTX, NON-STR,HDR IR-192: HCPCS | Performed by: RADIOLOGY

## 2020-07-30 PROCEDURE — 57156 INS VAG BRACHYTX DEVICE: CPT | Performed by: RADIOLOGY

## 2020-07-30 PROCEDURE — 77280 THER RAD SIMULAJ FIELD SMPL: CPT | Mod: 26 | Performed by: RADIOLOGY

## 2020-07-30 PROCEDURE — 77770 HDR RDNCL NTRSTL/ICAV BRCHTX: CPT | Performed by: RADIOLOGY

## 2020-07-30 PROCEDURE — 77332 RADIATION TREATMENT AID(S): CPT | Performed by: RADIOLOGY

## 2020-07-30 PROCEDURE — 77770 HDR RDNCL NTRSTL/ICAV BRCHTX: CPT | Mod: 26 | Performed by: RADIOLOGY

## 2020-07-30 PROCEDURE — 77280 THER RAD SIMULAJ FIELD SMPL: CPT | Performed by: RADIOLOGY

## 2020-07-30 NOTE — PROCEDURES
DATE OF SERVICE: 7/30/2020    TX NUMBER:  2    CYLINDER DIAMETER: 3.0    MARKER:    [x] Gold    SIMULATION VAGINAL CYLINDER:    Patient prior to immobilization on CT table was instructed to wear special brachytherapy underwear with Velcro straps designed to hold cylinder in place. In CT suite Vaginal cylinder of appropriate size previously determined at time of initial simulation placed into vagina.  Cylinder held in place by special underwear with Velcro straps. CT images acquired and cylinder and gold marker relationship reviewed.  Images approved.  Patient transferred to Brachytherapy suite and transfer catheter attached to HDR unit. Catheter length verified. Attached to HDR afterloader verified by myself and physicist.    Treatment delivered.       I have personally reviewed the relevant data, performed the target localization, and determined all relevant factors for this patient’s simulation.

## 2020-07-30 NOTE — PROGRESS NOTES
Met with pt today during radiation treatment appointment.  Reintroduced myself and provided my contact information.  Enquired again about barriers to care.  Pt continued to deny needs.  Encouraged to call with questions or needs.

## 2020-08-03 ENCOUNTER — HOSPITAL ENCOUNTER (OUTPATIENT)
Dept: RADIATION ONCOLOGY | Facility: MEDICAL CENTER | Age: 65
End: 2020-08-31
Attending: RADIOLOGY
Payer: MEDICARE

## 2020-08-04 ENCOUNTER — HOSPITAL ENCOUNTER (OUTPATIENT)
Dept: RADIATION ONCOLOGY | Facility: MEDICAL CENTER | Age: 65
End: 2020-08-04
Payer: MEDICARE

## 2020-08-04 PROCEDURE — C1717 BRACHYTX, NON-STR,HDR IR-192: HCPCS | Performed by: RADIOLOGY

## 2020-08-04 PROCEDURE — 77770 HDR RDNCL NTRSTL/ICAV BRCHTX: CPT | Mod: 26 | Performed by: RADIOLOGY

## 2020-08-04 PROCEDURE — 77332 RADIATION TREATMENT AID(S): CPT | Performed by: RADIOLOGY

## 2020-08-04 PROCEDURE — 77280 THER RAD SIMULAJ FIELD SMPL: CPT | Mod: XU | Performed by: RADIOLOGY

## 2020-08-04 PROCEDURE — 77280 THER RAD SIMULAJ FIELD SMPL: CPT | Mod: 26 | Performed by: RADIOLOGY

## 2020-08-04 PROCEDURE — 57156 INS VAG BRACHYTX DEVICE: CPT | Performed by: RADIOLOGY

## 2020-08-04 PROCEDURE — 77336 RADIATION PHYSICS CONSULT: CPT | Mod: XU | Performed by: RADIOLOGY

## 2020-08-04 PROCEDURE — 77770 HDR RDNCL NTRSTL/ICAV BRCHTX: CPT | Performed by: RADIOLOGY

## 2020-08-04 NOTE — PROCEDURES
DATE OF SERVICE: 8/4/2020    TX NUMBER: 3    CYLINDER DIAMETER: 3.0    MARKER:    [x] Gold    SIMULATION VAGINAL CYLINDER:    Patient prior to immobilization on CT table was instructed to wear special brachytherapy underwear with Velcro straps designed to hold cylinder in place. In CT suite Vaginal cylinder of appropriate size previously determined at time of initial simulation placed into vagina.  Cylinder held in place by special underwear with Velcro straps. CT images acquired and cylinder and gold marker relationship reviewed.  Images approved.  Patient transferred to Brachytherapy suite and transfer catheter attached to HDR unit. Catheter length verified. Attached to HDR afterloader verified by myself and physicist.    Treatment delivered.       I have personally reviewed the relevant data, performed the target localization, and determined all relevant factors for this patient’s simulation.

## 2020-08-06 ENCOUNTER — HOSPITAL ENCOUNTER (OUTPATIENT)
Dept: RADIATION ONCOLOGY | Facility: MEDICAL CENTER | Age: 65
End: 2020-08-06
Payer: MEDICARE

## 2020-08-06 PROCEDURE — 77770 HDR RDNCL NTRSTL/ICAV BRCHTX: CPT | Mod: 26 | Performed by: RADIOLOGY

## 2020-08-06 PROCEDURE — C1717 BRACHYTX, NON-STR,HDR IR-192: HCPCS | Performed by: RADIOLOGY

## 2020-08-06 PROCEDURE — 57156 INS VAG BRACHYTX DEVICE: CPT | Performed by: RADIOLOGY

## 2020-08-06 PROCEDURE — 77770 HDR RDNCL NTRSTL/ICAV BRCHTX: CPT | Performed by: RADIOLOGY

## 2020-08-06 PROCEDURE — 77280 THER RAD SIMULAJ FIELD SMPL: CPT | Mod: 26 | Performed by: RADIOLOGY

## 2020-08-06 PROCEDURE — 700101 HCHG RX REV CODE 250

## 2020-08-06 PROCEDURE — 77280 THER RAD SIMULAJ FIELD SMPL: CPT | Performed by: RADIOLOGY

## 2020-08-06 PROCEDURE — 77332 RADIATION TREATMENT AID(S): CPT | Performed by: RADIOLOGY

## 2020-08-06 RX ORDER — LIDOCAINE AND PRILOCAINE 25; 25 MG/G; MG/G
CREAM TOPICAL
Status: ACTIVE
Start: 2020-08-06 | End: 2020-08-06

## 2020-08-06 NOTE — PROCEDURES
DATE OF SERVICE: 8/6/2020    TX NUMBER:  4    CYLINDER DIAMETER: 3.0    MARKER:    [x] Gold    SIMULATION VAGINAL CYLINDER:    Patient prior to immobilization on CT table was instructed to wear special brachytherapy underwear with Velcro straps designed to hold cylinder in place. In CT suite Vaginal cylinder of appropriate size previously determined at time of initial simulation placed into vagina.  Cylinder held in place by special underwear with Velcro straps. CT images acquired and cylinder and gold marker relationship reviewed.  Images approved.  Patient transferred to Brachytherapy suite and transfer catheter attached to HDR unit. Catheter length verified. Attached to HDR afterloader verified by myself and physicist.    Treatment delivered.       I have personally reviewed the relevant data, performed the target localization, and determined all relevant factors for this patient’s simulation.

## 2020-08-11 ENCOUNTER — HOSPITAL ENCOUNTER (OUTPATIENT)
Dept: RADIATION ONCOLOGY | Facility: MEDICAL CENTER | Age: 65
End: 2020-08-11
Payer: MEDICARE

## 2020-08-11 PROCEDURE — 77280 THER RAD SIMULAJ FIELD SMPL: CPT | Mod: 26 | Performed by: RADIOLOGY

## 2020-08-11 PROCEDURE — C1717 BRACHYTX, NON-STR,HDR IR-192: HCPCS | Performed by: RADIOLOGY

## 2020-08-11 PROCEDURE — 57156 INS VAG BRACHYTX DEVICE: CPT | Performed by: RADIOLOGY

## 2020-08-11 PROCEDURE — 77770 HDR RDNCL NTRSTL/ICAV BRCHTX: CPT | Performed by: RADIOLOGY

## 2020-08-11 PROCEDURE — 77280 THER RAD SIMULAJ FIELD SMPL: CPT | Performed by: RADIOLOGY

## 2020-08-11 PROCEDURE — 77770 HDR RDNCL NTRSTL/ICAV BRCHTX: CPT | Mod: 26 | Performed by: RADIOLOGY

## 2020-08-11 PROCEDURE — 77332 RADIATION TREATMENT AID(S): CPT | Performed by: RADIOLOGY

## 2020-08-11 NOTE — PROCEDURES
DATE OF SERVICE: 8/11/2020    TX NUMBER:  5    CYLINDER DIAMETER: 3.0    MARKER:    [x] Gold    SIMULATION VAGINAL CYLINDER:    Patient prior to immobilization on CT table was instructed to wear special brachytherapy underwear with Velcro straps designed to hold cylinder in place. In CT suite Vaginal cylinder of appropriate size previously determined at time of initial simulation placed into vagina.  Cylinder held in place by special underwear with Velcro straps. CT images acquired and cylinder and gold marker relationship reviewed.  Images approved.  Patient transferred to Brachytherapy suite and transfer catheter attached to HDR unit. Catheter length verified. Attached to HDR afterloader verified by myself and physicist.    Treatment delivered.       I have personally reviewed the relevant data, performed the target localization, and determined all relevant factors for this patient’s simulation.

## 2020-10-06 ENCOUNTER — HOSPITAL ENCOUNTER (OUTPATIENT)
Dept: RADIATION ONCOLOGY | Facility: MEDICAL CENTER | Age: 65
End: 2020-10-31
Attending: RADIOLOGY
Payer: MEDICARE

## 2020-10-06 PROCEDURE — 99212 OFFICE O/P EST SF 10 MIN: CPT | Performed by: RADIOLOGY
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
## 2021-03-03 DIAGNOSIS — Z23 NEED FOR VACCINATION: ICD-10-CM

## 2021-04-19 ENCOUNTER — HOSPITAL ENCOUNTER (OUTPATIENT)
Dept: LAB | Facility: MEDICAL CENTER | Age: 66
End: 2021-04-19
Attending: FAMILY MEDICINE
Payer: MEDICARE

## 2021-04-19 LAB
ALBUMIN SERPL BCP-MCNC: 4.4 G/DL (ref 3.2–4.9)
ALBUMIN/GLOB SERPL: 1.3 G/DL
ALP SERPL-CCNC: 88 U/L (ref 30–99)
ALT SERPL-CCNC: 58 U/L (ref 2–50)
ANION GAP SERPL CALC-SCNC: 18 MMOL/L (ref 7–16)
AST SERPL-CCNC: 48 U/L (ref 12–45)
BASOPHILS # BLD AUTO: 0.8 % (ref 0–1.8)
BASOPHILS # BLD: 0.07 K/UL (ref 0–0.12)
BILIRUB SERPL-MCNC: 0.3 MG/DL (ref 0.1–1.5)
BUN SERPL-MCNC: 27 MG/DL (ref 8–22)
CALCIUM SERPL-MCNC: 10.1 MG/DL (ref 8.4–10.2)
CHLORIDE SERPL-SCNC: 96 MMOL/L (ref 96–112)
CHOLEST SERPL-MCNC: 341 MG/DL (ref 100–199)
CO2 SERPL-SCNC: 19 MMOL/L (ref 20–33)
CREAT SERPL-MCNC: 1.51 MG/DL (ref 0.5–1.4)
EOSINOPHIL # BLD AUTO: 0.23 K/UL (ref 0–0.51)
EOSINOPHIL NFR BLD: 2.5 % (ref 0–6.9)
ERYTHROCYTE [DISTWIDTH] IN BLOOD BY AUTOMATED COUNT: 45 FL (ref 35.9–50)
EST. AVERAGE GLUCOSE BLD GHB EST-MCNC: 108 MG/DL
FASTING STATUS PATIENT QL REPORTED: NORMAL
GLOBULIN SER CALC-MCNC: 3.3 G/DL (ref 1.9–3.5)
GLUCOSE SERPL-MCNC: 99 MG/DL (ref 65–99)
HBA1C MFR BLD: 5.4 % (ref 4–5.6)
HCT VFR BLD AUTO: 41.8 % (ref 37–47)
HDLC SERPL-MCNC: 51 MG/DL
HGB BLD-MCNC: 13.9 G/DL (ref 12–16)
IMM GRANULOCYTES # BLD AUTO: 0.09 K/UL (ref 0–0.11)
IMM GRANULOCYTES NFR BLD AUTO: 1 % (ref 0–0.9)
LDLC SERPL CALC-MCNC: 230 MG/DL
LYMPHOCYTES # BLD AUTO: 2.49 K/UL (ref 1–4.8)
LYMPHOCYTES NFR BLD: 27.3 % (ref 22–41)
MCH RBC QN AUTO: 33.3 PG (ref 27–33)
MCHC RBC AUTO-ENTMCNC: 33.3 G/DL (ref 33.6–35)
MCV RBC AUTO: 100.2 FL (ref 81.4–97.8)
MONOCYTES # BLD AUTO: 0.81 K/UL (ref 0–0.85)
MONOCYTES NFR BLD AUTO: 8.9 % (ref 0–13.4)
NEUTROPHILS # BLD AUTO: 5.44 K/UL (ref 2–7.15)
NEUTROPHILS NFR BLD: 59.5 % (ref 44–72)
NRBC # BLD AUTO: 0 K/UL
NRBC BLD-RTO: 0 /100 WBC
PLATELET # BLD AUTO: 236 K/UL (ref 164–446)
PMV BLD AUTO: 10.3 FL (ref 9–12.9)
POTASSIUM SERPL-SCNC: 4.2 MMOL/L (ref 3.6–5.5)
PROT SERPL-MCNC: 7.7 G/DL (ref 6–8.2)
RBC # BLD AUTO: 4.17 M/UL (ref 4.2–5.4)
SODIUM SERPL-SCNC: 133 MMOL/L (ref 135–145)
TRIGL SERPL-MCNC: 299 MG/DL (ref 0–149)
TSH SERPL DL<=0.005 MIU/L-ACNC: 4.67 UIU/ML (ref 0.38–5.33)
WBC # BLD AUTO: 9.1 K/UL (ref 4.8–10.8)

## 2021-04-19 PROCEDURE — 80061 LIPID PANEL: CPT

## 2021-04-19 PROCEDURE — 82306 VITAMIN D 25 HYDROXY: CPT

## 2021-04-19 PROCEDURE — 85025 COMPLETE CBC W/AUTO DIFF WBC: CPT

## 2021-04-19 PROCEDURE — 84443 ASSAY THYROID STIM HORMONE: CPT

## 2021-04-19 PROCEDURE — 83036 HEMOGLOBIN GLYCOSYLATED A1C: CPT

## 2021-04-19 PROCEDURE — 36415 COLL VENOUS BLD VENIPUNCTURE: CPT

## 2021-04-19 PROCEDURE — 80053 COMPREHEN METABOLIC PANEL: CPT

## 2021-04-21 LAB — 25(OH)D3 SERPL-MCNC: 67 NG/ML (ref 30–80)

## 2021-06-04 ENCOUNTER — IMMUNIZATION (OUTPATIENT)
Dept: FAMILY PLANNING/WOMEN'S HEALTH CLINIC | Facility: IMMUNIZATION CENTER | Age: 66
End: 2021-06-04
Payer: MEDICARE

## 2021-06-04 DIAGNOSIS — Z23 ENCOUNTER FOR VACCINATION: Primary | ICD-10-CM

## 2021-06-04 PROCEDURE — 91300 PFIZER SARS-COV-2 VACCINE: CPT

## 2021-06-04 PROCEDURE — 0002A PFIZER SARS-COV-2 VACCINE: CPT

## 2021-08-21 ENCOUNTER — PATIENT MESSAGE (OUTPATIENT)
Dept: HEALTH INFORMATION MANAGEMENT | Facility: OTHER | Age: 66
End: 2021-08-21

## 2021-09-03 ENCOUNTER — PATIENT OUTREACH (OUTPATIENT)
Dept: HEALTH INFORMATION MANAGEMENT | Facility: OTHER | Age: 66
End: 2021-09-03

## 2021-09-03 NOTE — PROGRESS NOTES
Outcome: Left Message to schedule fit and mammo    Please transfer to Patient Outreach Team at [phone] when patient returns call.      Attempt # 1

## 2021-12-08 ENCOUNTER — TELEPHONE (OUTPATIENT)
Dept: HEALTH INFORMATION MANAGEMENT | Facility: OTHER | Age: 66
End: 2021-12-08

## 2022-04-04 NOTE — ANESTHESIA PREPROCEDURE EVALUATION
Relevant Problems   NEURO   (+) History of DVT (deep vein thrombosis)      CARDIAC   (+) Deep vein thrombosis (HCC) [I82.409]   (+) Essential hypertension       Physical Exam    Airway   Mallampati: II  TM distance: >3 FB  Neck ROM: full       Cardiovascular - normal exam  Rhythm: regular  Rate: normal  (-) murmur     Dental - normal exam           Pulmonary - normal exam  Breath sounds clear to auscultation     Abdominal    Neurological - normal exam                 Anesthesia Plan    ASA 3       Plan - general       Airway plan will be ETT        Induction: intravenous    Postoperative Plan: Postoperative administration of opioids is intended.    Pertinent diagnostic labs and testing reviewed    Informed Consent:    Anesthetic plan and risks discussed with patient.    Use of blood products discussed with: patient whom consented to blood products.          No

## 2022-07-19 ENCOUNTER — TELEPHONE (OUTPATIENT)
Dept: HEALTH INFORMATION MANAGEMENT | Facility: OTHER | Age: 67
End: 2022-07-19

## 2023-09-07 ENCOUNTER — DOCUMENTATION (OUTPATIENT)
Dept: HEALTH INFORMATION MANAGEMENT | Facility: OTHER | Age: 68
End: 2023-09-07
Payer: MEDICARE

## 2023-09-08 ENCOUNTER — TELEPHONE (OUTPATIENT)
Dept: HEALTH INFORMATION MANAGEMENT | Facility: OTHER | Age: 68
End: 2023-09-08

## 2023-12-11 ENCOUNTER — HOSPITAL ENCOUNTER (OUTPATIENT)
Dept: RADIOLOGY | Facility: MEDICAL CENTER | Age: 68
End: 2023-12-11
Attending: FAMILY MEDICINE
Payer: MEDICARE

## 2023-12-11 ENCOUNTER — HOSPITAL ENCOUNTER (OUTPATIENT)
Dept: LAB | Facility: MEDICAL CENTER | Age: 68
End: 2023-12-11
Attending: FAMILY MEDICINE
Payer: MEDICARE

## 2023-12-11 DIAGNOSIS — M47.896 OTHER SPONDYLOSIS, LUMBAR REGION: ICD-10-CM

## 2023-12-11 DIAGNOSIS — M75.52 BURSITIS OF LEFT SHOULDER: ICD-10-CM

## 2023-12-11 LAB
ALBUMIN SERPL BCP-MCNC: 4.2 G/DL (ref 3.2–4.9)
ALBUMIN/GLOB SERPL: 1.3 G/DL
ALP SERPL-CCNC: 85 U/L (ref 30–99)
ALT SERPL-CCNC: 93 U/L (ref 2–50)
ANION GAP SERPL CALC-SCNC: 16 MMOL/L (ref 7–16)
AST SERPL-CCNC: 71 U/L (ref 12–45)
BASOPHILS # BLD AUTO: 0.9 % (ref 0–1.8)
BASOPHILS # BLD: 0.08 K/UL (ref 0–0.12)
BILIRUB SERPL-MCNC: 0.4 MG/DL (ref 0.1–1.5)
BUN SERPL-MCNC: 19 MG/DL (ref 8–22)
CALCIUM ALBUM COR SERPL-MCNC: 10.3 MG/DL (ref 8.5–10.5)
CALCIUM SERPL-MCNC: 10.5 MG/DL (ref 8.4–10.2)
CHLORIDE SERPL-SCNC: 102 MMOL/L (ref 96–112)
CHOLEST SERPL-MCNC: 475 MG/DL (ref 100–199)
CO2 SERPL-SCNC: 20 MMOL/L (ref 20–33)
CREAT SERPL-MCNC: 1.16 MG/DL (ref 0.5–1.4)
EOSINOPHIL # BLD AUTO: 0.27 K/UL (ref 0–0.51)
EOSINOPHIL NFR BLD: 3.2 % (ref 0–6.9)
ERYTHROCYTE [DISTWIDTH] IN BLOOD BY AUTOMATED COUNT: 47.3 FL (ref 35.9–50)
EST. AVERAGE GLUCOSE BLD GHB EST-MCNC: 117 MG/DL
FASTING STATUS PATIENT QL REPORTED: NORMAL
GFR SERPLBLD CREATININE-BSD FMLA CKD-EPI: 51 ML/MIN/1.73 M 2
GLOBULIN SER CALC-MCNC: 3.2 G/DL (ref 1.9–3.5)
GLUCOSE SERPL-MCNC: 110 MG/DL (ref 65–99)
HBA1C MFR BLD: 5.7 % (ref 4–5.6)
HCT VFR BLD AUTO: 42.7 % (ref 37–47)
HDLC SERPL-MCNC: 46 MG/DL
HGB BLD-MCNC: 14.4 G/DL (ref 12–16)
IMM GRANULOCYTES # BLD AUTO: 0.06 K/UL (ref 0–0.11)
IMM GRANULOCYTES NFR BLD AUTO: 0.7 % (ref 0–0.9)
LDLC SERPL CALC-MCNC: ABNORMAL MG/DL
LYMPHOCYTES # BLD AUTO: 2.68 K/UL (ref 1–4.8)
LYMPHOCYTES NFR BLD: 31.6 % (ref 22–41)
MCH RBC QN AUTO: 33.6 PG (ref 27–33)
MCHC RBC AUTO-ENTMCNC: 33.7 G/DL (ref 32.2–35.5)
MCV RBC AUTO: 99.8 FL (ref 81.4–97.8)
MONOCYTES # BLD AUTO: 0.7 K/UL (ref 0–0.85)
MONOCYTES NFR BLD AUTO: 8.3 % (ref 0–13.4)
NEUTROPHILS # BLD AUTO: 4.69 K/UL (ref 1.82–7.42)
NEUTROPHILS NFR BLD: 55.3 % (ref 44–72)
NRBC # BLD AUTO: 0 K/UL
NRBC BLD-RTO: 0 /100 WBC (ref 0–0.2)
PLATELET # BLD AUTO: 229 K/UL (ref 164–446)
PMV BLD AUTO: 10.7 FL (ref 9–12.9)
POTASSIUM SERPL-SCNC: 4.5 MMOL/L (ref 3.6–5.5)
PROT SERPL-MCNC: 7.4 G/DL (ref 6–8.2)
RBC # BLD AUTO: 4.28 M/UL (ref 4.2–5.4)
SODIUM SERPL-SCNC: 138 MMOL/L (ref 135–145)
T4 FREE SERPL-MCNC: 1.14 NG/DL (ref 0.93–1.7)
TRIGL SERPL-MCNC: 495 MG/DL (ref 0–149)
TSH SERPL DL<=0.005 MIU/L-ACNC: 4.15 UIU/ML (ref 0.38–5.33)
WBC # BLD AUTO: 8.5 K/UL (ref 4.8–10.8)

## 2023-12-11 PROCEDURE — 84439 ASSAY OF FREE THYROXINE: CPT

## 2023-12-11 PROCEDURE — 72110 X-RAY EXAM L-2 SPINE 4/>VWS: CPT

## 2023-12-11 PROCEDURE — 36415 COLL VENOUS BLD VENIPUNCTURE: CPT

## 2023-12-11 PROCEDURE — 85025 COMPLETE CBC W/AUTO DIFF WBC: CPT

## 2023-12-11 PROCEDURE — 84443 ASSAY THYROID STIM HORMONE: CPT

## 2023-12-11 PROCEDURE — 83036 HEMOGLOBIN GLYCOSYLATED A1C: CPT

## 2023-12-11 PROCEDURE — 73030 X-RAY EXAM OF SHOULDER: CPT | Mod: LT

## 2023-12-11 PROCEDURE — 80053 COMPREHEN METABOLIC PANEL: CPT

## 2023-12-11 PROCEDURE — 80061 LIPID PANEL: CPT

## 2023-12-22 ENCOUNTER — HOSPITAL ENCOUNTER (OUTPATIENT)
Dept: LAB | Facility: MEDICAL CENTER | Age: 68
End: 2023-12-22
Attending: FAMILY MEDICINE
Payer: MEDICARE

## 2023-12-22 LAB
ERYTHROCYTE [SEDIMENTATION RATE] IN BLOOD BY WESTERGREN METHOD: 11 MM/HOUR (ref 0–25)
FERRITIN SERPL-MCNC: 964 NG/ML (ref 10–291)
HAV IGM SERPL QL IA: NORMAL
HBV CORE IGM SER QL: NORMAL
HBV SURFACE AG SER QL: NORMAL
HCV AB SER QL: NORMAL
IRON SATN MFR SERPL: 41 % (ref 15–55)
IRON SERPL-MCNC: 121 UG/DL (ref 40–170)
TIBC SERPL-MCNC: 295 UG/DL (ref 250–450)
TRANSFERRIN SERPL-MCNC: 254 MG/DL (ref 200–370)
UIBC SERPL-MCNC: 174 UG/DL (ref 110–370)

## 2023-12-22 PROCEDURE — 85652 RBC SED RATE AUTOMATED: CPT

## 2023-12-22 PROCEDURE — 82728 ASSAY OF FERRITIN: CPT

## 2023-12-22 PROCEDURE — 83550 IRON BINDING TEST: CPT

## 2023-12-22 PROCEDURE — 83540 ASSAY OF IRON: CPT

## 2023-12-22 PROCEDURE — 82103 ALPHA-1-ANTITRYPSIN TOTAL: CPT

## 2023-12-22 PROCEDURE — 86038 ANTINUCLEAR ANTIBODIES: CPT

## 2023-12-22 PROCEDURE — 80074 ACUTE HEPATITIS PANEL: CPT

## 2023-12-22 PROCEDURE — 84466 ASSAY OF TRANSFERRIN: CPT

## 2023-12-22 PROCEDURE — 36415 COLL VENOUS BLD VENIPUNCTURE: CPT

## 2023-12-23 LAB — NUCLEAR IGG SER QL IA: NORMAL

## 2023-12-24 LAB — A1AT SERPL-MCNC: 149 MG/DL (ref 90–200)

## 2024-09-04 ENCOUNTER — PATIENT MESSAGE (OUTPATIENT)
Dept: HEALTH INFORMATION MANAGEMENT | Facility: OTHER | Age: 69
End: 2024-09-04

## 2024-10-03 ENCOUNTER — PATIENT MESSAGE (OUTPATIENT)
Dept: HEALTH INFORMATION MANAGEMENT | Facility: OTHER | Age: 69
End: 2024-10-03

## 2024-10-10 ENCOUNTER — PATIENT MESSAGE (OUTPATIENT)
Dept: HEALTH INFORMATION MANAGEMENT | Facility: OTHER | Age: 69
End: 2024-10-10

## 2025-06-13 ENCOUNTER — HOSPITAL ENCOUNTER (OUTPATIENT)
Facility: MEDICAL CENTER | Age: 70
End: 2025-06-13
Attending: FAMILY MEDICINE
Payer: MEDICARE

## 2025-06-13 LAB
25(OH)D3 SERPL-MCNC: 76 NG/ML (ref 30–100)
ALBUMIN SERPL BCP-MCNC: 4.2 G/DL (ref 3.2–4.9)
ALBUMIN/GLOB SERPL: 1.4 G/DL
ALP SERPL-CCNC: 92 U/L (ref 30–99)
ALT SERPL-CCNC: 26 U/L (ref 2–50)
ANION GAP SERPL CALC-SCNC: 17 MMOL/L (ref 7–16)
APPEARANCE UR: CLEAR
AST SERPL-CCNC: 25 U/L (ref 12–45)
BACTERIA #/AREA URNS HPF: ABNORMAL /HPF
BASOPHILS # BLD AUTO: 0.6 % (ref 0–1.8)
BASOPHILS # BLD: 0.06 K/UL (ref 0–0.12)
BILIRUB SERPL-MCNC: 0.3 MG/DL (ref 0.1–1.5)
BILIRUB UR QL STRIP.AUTO: NEGATIVE
BUN SERPL-MCNC: 19 MG/DL (ref 8–22)
CALCIUM ALBUM COR SERPL-MCNC: 10.2 MG/DL (ref 8.5–10.5)
CALCIUM SERPL-MCNC: 10.4 MG/DL (ref 8.5–10.5)
CASTS URNS QL MICRO: ABNORMAL /LPF (ref 0–2)
CHLORIDE SERPL-SCNC: 102 MMOL/L (ref 96–112)
CHOLEST SERPL-MCNC: 376 MG/DL (ref 100–199)
CO2 SERPL-SCNC: 22 MMOL/L (ref 20–33)
COLOR UR: YELLOW
CREAT SERPL-MCNC: 1.27 MG/DL (ref 0.5–1.4)
EOSINOPHIL # BLD AUTO: 0.24 K/UL (ref 0–0.51)
EOSINOPHIL NFR BLD: 2.4 % (ref 0–6.9)
EPITHELIAL CELLS 1715: ABNORMAL /HPF (ref 0–5)
ERYTHROCYTE [DISTWIDTH] IN BLOOD BY AUTOMATED COUNT: 49.2 FL (ref 35.9–50)
FASTING STATUS PATIENT QL REPORTED: NORMAL
GFR SERPLBLD CREATININE-BSD FMLA CKD-EPI: 45 ML/MIN/1.73 M 2
GLOBULIN SER CALC-MCNC: 3.1 G/DL (ref 1.9–3.5)
GLUCOSE SERPL-MCNC: 105 MG/DL (ref 65–99)
GLUCOSE UR STRIP.AUTO-MCNC: NEGATIVE MG/DL
HCT VFR BLD AUTO: 43.8 % (ref 37–47)
HDLC SERPL-MCNC: 41 MG/DL
HGB BLD-MCNC: 14.1 G/DL (ref 12–16)
IMM GRANULOCYTES # BLD AUTO: 0.04 K/UL (ref 0–0.11)
IMM GRANULOCYTES NFR BLD AUTO: 0.4 % (ref 0–0.9)
KETONES UR STRIP.AUTO-MCNC: ABNORMAL MG/DL
LDLC SERPL CALC-MCNC: ABNORMAL MG/DL
LEUKOCYTE ESTERASE UR QL STRIP.AUTO: ABNORMAL
LYMPHOCYTES # BLD AUTO: 3.29 K/UL (ref 1–4.8)
LYMPHOCYTES NFR BLD: 32.6 % (ref 22–41)
MCH RBC QN AUTO: 32.6 PG (ref 27–33)
MCHC RBC AUTO-ENTMCNC: 32.2 G/DL (ref 32.2–35.5)
MCV RBC AUTO: 101.2 FL (ref 81.4–97.8)
MICRO URNS: ABNORMAL
MONOCYTES # BLD AUTO: 0.67 K/UL (ref 0–0.85)
MONOCYTES NFR BLD AUTO: 6.6 % (ref 0–13.4)
NEUTROPHILS # BLD AUTO: 5.8 K/UL (ref 1.82–7.42)
NEUTROPHILS NFR BLD: 57.4 % (ref 44–72)
NITRITE UR QL STRIP.AUTO: NEGATIVE
NRBC # BLD AUTO: 0 K/UL
NRBC BLD-RTO: 0 /100 WBC (ref 0–0.2)
PH UR STRIP.AUTO: 6.5 [PH] (ref 5–8)
PLATELET # BLD AUTO: 283 K/UL (ref 164–446)
PMV BLD AUTO: 11.7 FL (ref 9–12.9)
POTASSIUM SERPL-SCNC: 4.5 MMOL/L (ref 3.6–5.5)
PROT SERPL-MCNC: 7.3 G/DL (ref 6–8.2)
PROT UR QL STRIP: NEGATIVE MG/DL
RBC # BLD AUTO: 4.33 M/UL (ref 4.2–5.4)
RBC # URNS HPF: ABNORMAL /HPF
RBC UR QL AUTO: NEGATIVE
SODIUM SERPL-SCNC: 141 MMOL/L (ref 135–145)
SP GR UR STRIP.AUTO: 1.02
TRIGL SERPL-MCNC: 439 MG/DL (ref 0–149)
TSH SERPL DL<=0.005 MIU/L-ACNC: 3.86 UIU/ML (ref 0.38–5.33)
UROBILINOGEN UR STRIP.AUTO-MCNC: 1 EU/DL
WBC # BLD AUTO: 10.1 K/UL (ref 4.8–10.8)
WBC #/AREA URNS HPF: ABNORMAL /HPF

## 2025-06-13 PROCEDURE — 36415 COLL VENOUS BLD VENIPUNCTURE: CPT

## 2025-06-13 PROCEDURE — 81001 URINALYSIS AUTO W/SCOPE: CPT

## 2025-06-13 PROCEDURE — 80053 COMPREHEN METABOLIC PANEL: CPT

## 2025-06-13 PROCEDURE — 82306 VITAMIN D 25 HYDROXY: CPT

## 2025-06-13 PROCEDURE — 84443 ASSAY THYROID STIM HORMONE: CPT

## 2025-06-13 PROCEDURE — 80061 LIPID PANEL: CPT

## 2025-06-13 PROCEDURE — 85025 COMPLETE CBC W/AUTO DIFF WBC: CPT

## 2025-08-30 ENCOUNTER — HOSPITAL ENCOUNTER (EMERGENCY)
Facility: MEDICAL CENTER | Age: 70
End: 2025-08-31
Attending: STUDENT IN AN ORGANIZED HEALTH CARE EDUCATION/TRAINING PROGRAM
Payer: MEDICARE

## 2025-08-30 ENCOUNTER — APPOINTMENT (OUTPATIENT)
Dept: RADIOLOGY | Facility: MEDICAL CENTER | Age: 70
End: 2025-08-30
Attending: STUDENT IN AN ORGANIZED HEALTH CARE EDUCATION/TRAINING PROGRAM
Payer: MEDICARE

## 2025-08-30 VITALS
HEART RATE: 91 BPM | HEIGHT: 64 IN | OXYGEN SATURATION: 95 % | DIASTOLIC BLOOD PRESSURE: 71 MMHG | TEMPERATURE: 99.2 F | RESPIRATION RATE: 18 BRPM | WEIGHT: 208.78 LBS | SYSTOLIC BLOOD PRESSURE: 165 MMHG | BODY MASS INDEX: 35.64 KG/M2

## 2025-08-30 DIAGNOSIS — R18.8 INTRA-ABDOMINAL FLUID COLLECTION: Primary | ICD-10-CM

## 2025-08-30 DIAGNOSIS — R10.31 RIGHT LOWER QUADRANT ABDOMINAL PAIN: ICD-10-CM

## 2025-08-30 LAB
ALBUMIN SERPL BCP-MCNC: 4.2 G/DL (ref 3.2–4.9)
ALBUMIN/GLOB SERPL: 1.2 G/DL
ALP SERPL-CCNC: 94 U/L (ref 30–99)
ALT SERPL-CCNC: 19 U/L (ref 2–50)
ANION GAP SERPL CALC-SCNC: 19 MMOL/L (ref 7–16)
APPEARANCE UR: CLEAR
AST SERPL-CCNC: 28 U/L (ref 12–45)
BACTERIA #/AREA URNS HPF: ABNORMAL /HPF
BASOPHILS # BLD AUTO: 0.4 % (ref 0–1.8)
BASOPHILS # BLD: 0.05 K/UL (ref 0–0.12)
BILIRUB SERPL-MCNC: 0.5 MG/DL (ref 0.1–1.5)
BILIRUB UR QL STRIP.AUTO: NEGATIVE
BUN SERPL-MCNC: 27 MG/DL (ref 8–22)
CALCIUM ALBUM COR SERPL-MCNC: 9.9 MG/DL (ref 8.5–10.5)
CALCIUM SERPL-MCNC: 10.1 MG/DL (ref 8.5–10.5)
CASTS URNS QL MICRO: ABNORMAL /LPF (ref 0–2)
CHLORIDE SERPL-SCNC: 98 MMOL/L (ref 96–112)
CO2 SERPL-SCNC: 15 MMOL/L (ref 20–33)
COLOR UR: YELLOW
CREAT SERPL-MCNC: 1.44 MG/DL (ref 0.5–1.4)
EOSINOPHIL # BLD AUTO: 0.03 K/UL (ref 0–0.51)
EOSINOPHIL NFR BLD: 0.2 % (ref 0–6.9)
EPITHELIAL CELLS 1715: ABNORMAL /HPF (ref 0–5)
ERYTHROCYTE [DISTWIDTH] IN BLOOD BY AUTOMATED COUNT: 45.9 FL (ref 35.9–50)
GFR SERPLBLD CREATININE-BSD FMLA CKD-EPI: 39 ML/MIN/1.73 M 2
GLOBULIN SER CALC-MCNC: 3.5 G/DL (ref 1.9–3.5)
GLUCOSE SERPL-MCNC: 117 MG/DL (ref 65–99)
GLUCOSE UR STRIP.AUTO-MCNC: NEGATIVE MG/DL
HCT VFR BLD AUTO: 44.2 % (ref 37–47)
HGB BLD-MCNC: 15.2 G/DL (ref 12–16)
IMM GRANULOCYTES # BLD AUTO: 0.08 K/UL (ref 0–0.11)
IMM GRANULOCYTES NFR BLD AUTO: 0.6 % (ref 0–0.9)
KETONES UR STRIP.AUTO-MCNC: 15 MG/DL
LACTATE SERPL-SCNC: 1.8 MMOL/L (ref 0.5–2)
LEUKOCYTE ESTERASE UR QL STRIP.AUTO: ABNORMAL
LIPASE SERPL-CCNC: 23 U/L (ref 11–82)
LYMPHOCYTES # BLD AUTO: 2.11 K/UL (ref 1–4.8)
LYMPHOCYTES NFR BLD: 15.5 % (ref 22–41)
MCH RBC QN AUTO: 33.6 PG (ref 27–33)
MCHC RBC AUTO-ENTMCNC: 34.4 G/DL (ref 32.2–35.5)
MCV RBC AUTO: 97.8 FL (ref 81.4–97.8)
MICRO URNS: ABNORMAL
MONOCYTES # BLD AUTO: 0.78 K/UL (ref 0–0.85)
MONOCYTES NFR BLD AUTO: 5.7 % (ref 0–13.4)
NEUTROPHILS # BLD AUTO: 10.58 K/UL (ref 1.82–7.42)
NEUTROPHILS NFR BLD: 77.6 % (ref 44–72)
NITRITE UR QL STRIP.AUTO: NEGATIVE
NRBC # BLD AUTO: 0 K/UL
NRBC BLD-RTO: 0 /100 WBC (ref 0–0.2)
PH UR STRIP.AUTO: 5.5 [PH] (ref 5–8)
PLATELET # BLD AUTO: 267 K/UL (ref 164–446)
PMV BLD AUTO: 11.4 FL (ref 9–12.9)
POTASSIUM SERPL-SCNC: 4.8 MMOL/L (ref 3.6–5.5)
PROT SERPL-MCNC: 7.7 G/DL (ref 6–8.2)
PROT UR QL STRIP: NEGATIVE MG/DL
RBC # BLD AUTO: 4.52 M/UL (ref 4.2–5.4)
RBC # URNS HPF: ABNORMAL /HPF
RBC UR QL AUTO: NEGATIVE
SODIUM SERPL-SCNC: 132 MMOL/L (ref 135–145)
SP GR UR STRIP.AUTO: 1.03
UROBILINOGEN UR STRIP.AUTO-MCNC: 0.2 EU/DL
WBC # BLD AUTO: 13.6 K/UL (ref 4.8–10.8)
WBC #/AREA URNS HPF: ABNORMAL /HPF

## 2025-08-30 PROCEDURE — 80053 COMPREHEN METABOLIC PANEL: CPT

## 2025-08-30 PROCEDURE — 87040 BLOOD CULTURE FOR BACTERIA: CPT

## 2025-08-30 PROCEDURE — 700111 HCHG RX REV CODE 636 W/ 250 OVERRIDE (IP): Mod: JZ | Performed by: STUDENT IN AN ORGANIZED HEALTH CARE EDUCATION/TRAINING PROGRAM

## 2025-08-30 PROCEDURE — A9270 NON-COVERED ITEM OR SERVICE: HCPCS | Performed by: STUDENT IN AN ORGANIZED HEALTH CARE EDUCATION/TRAINING PROGRAM

## 2025-08-30 PROCEDURE — 700117 HCHG RX CONTRAST REV CODE 255: Performed by: STUDENT IN AN ORGANIZED HEALTH CARE EDUCATION/TRAINING PROGRAM

## 2025-08-30 PROCEDURE — 83690 ASSAY OF LIPASE: CPT

## 2025-08-30 PROCEDURE — 83605 ASSAY OF LACTIC ACID: CPT

## 2025-08-30 PROCEDURE — 71045 X-RAY EXAM CHEST 1 VIEW: CPT

## 2025-08-30 PROCEDURE — 87086 URINE CULTURE/COLONY COUNT: CPT

## 2025-08-30 PROCEDURE — 700102 HCHG RX REV CODE 250 W/ 637 OVERRIDE(OP): Performed by: STUDENT IN AN ORGANIZED HEALTH CARE EDUCATION/TRAINING PROGRAM

## 2025-08-30 PROCEDURE — 74177 CT ABD & PELVIS W/CONTRAST: CPT

## 2025-08-30 PROCEDURE — 700105 HCHG RX REV CODE 258: Performed by: STUDENT IN AN ORGANIZED HEALTH CARE EDUCATION/TRAINING PROGRAM

## 2025-08-30 PROCEDURE — 85025 COMPLETE CBC W/AUTO DIFF WBC: CPT

## 2025-08-30 PROCEDURE — 81001 URINALYSIS AUTO W/SCOPE: CPT

## 2025-08-30 RX ORDER — SODIUM CHLORIDE 9 MG/ML
30 INJECTION, SOLUTION INTRAVENOUS ONCE
Status: COMPLETED | OUTPATIENT
Start: 2025-08-30 | End: 2025-08-30

## 2025-08-30 RX ORDER — METRONIDAZOLE 500 MG/100ML
500 INJECTION, SOLUTION INTRAVENOUS ONCE
Status: COMPLETED | OUTPATIENT
Start: 2025-08-30 | End: 2025-08-30

## 2025-08-30 RX ORDER — CEFTRIAXONE 2 G/1
2000 INJECTION, POWDER, FOR SOLUTION INTRAMUSCULAR; INTRAVENOUS ONCE
Status: COMPLETED | OUTPATIENT
Start: 2025-08-30 | End: 2025-08-30

## 2025-08-30 RX ORDER — ONDANSETRON 2 MG/ML
4 INJECTION INTRAMUSCULAR; INTRAVENOUS ONCE
Status: COMPLETED | OUTPATIENT
Start: 2025-08-30 | End: 2025-08-30

## 2025-08-30 RX ORDER — SODIUM CHLORIDE 9 MG/ML
1000 INJECTION, SOLUTION INTRAVENOUS ONCE
Status: COMPLETED | OUTPATIENT
Start: 2025-08-30 | End: 2025-08-30

## 2025-08-30 RX ORDER — HYDROMORPHONE HYDROCHLORIDE 1 MG/ML
0.5 INJECTION, SOLUTION INTRAMUSCULAR; INTRAVENOUS; SUBCUTANEOUS ONCE
Status: COMPLETED | OUTPATIENT
Start: 2025-08-30 | End: 2025-08-30

## 2025-08-30 RX ORDER — OXYCODONE HYDROCHLORIDE 5 MG/1
5 TABLET ORAL ONCE
Refills: 0 | Status: COMPLETED | OUTPATIENT
Start: 2025-08-31 | End: 2025-08-30

## 2025-08-30 RX ADMIN — CEFTRIAXONE SODIUM 2000 MG: 2 INJECTION, POWDER, FOR SOLUTION INTRAMUSCULAR; INTRAVENOUS at 21:29

## 2025-08-30 RX ADMIN — METRONIDAZOLE 500 MG: 5 INJECTION, SOLUTION INTRAVENOUS at 22:00

## 2025-08-30 RX ADMIN — SODIUM CHLORIDE 1000 ML: 9 INJECTION, SOLUTION INTRAVENOUS at 19:57

## 2025-08-30 RX ADMIN — IOHEXOL 100 ML: 350 INJECTION, SOLUTION INTRAVENOUS at 20:32

## 2025-08-30 RX ADMIN — HYDROMORPHONE HYDROCHLORIDE 0.5 MG: 1 INJECTION, SOLUTION INTRAMUSCULAR; INTRAVENOUS; SUBCUTANEOUS at 20:05

## 2025-08-30 RX ADMIN — OXYCODONE 5 MG: 5 TABLET ORAL at 23:50

## 2025-08-30 RX ADMIN — ONDANSETRON 4 MG: 2 INJECTION INTRAMUSCULAR; INTRAVENOUS at 19:57

## 2025-08-30 RX ADMIN — SODIUM CHLORIDE 1641 ML: 9 INJECTION, SOLUTION INTRAVENOUS at 21:15

## 2025-08-30 ASSESSMENT — LIFESTYLE VARIABLES
HOW MANY STANDARD DRINKS CONTAINING ALCOHOL DO YOU HAVE ON A TYPICAL DAY: 1 OR 2
SKIP TO QUESTIONS 9-10: 1
HOW OFTEN DO YOU HAVE A DRINK CONTAINING ALCOHOL: 4 OR MORE TIMES A WEEK
AUDIT-C TOTAL SCORE: 4
HOW OFTEN DO YOU HAVE SIX OR MORE DRINKS ON ONE OCCASION: NEVER

## 2025-08-30 ASSESSMENT — PAIN DESCRIPTION - PAIN TYPE
TYPE: ACUTE PAIN

## 2025-08-30 ASSESSMENT — FIBROSIS 4 INDEX: FIB4 SCORE: 1.21

## (undated) DEVICE — SET IRRIGATION CYSTOSCOPY TUBE L80 IN (20EA/CA)

## (undated) DEVICE — LACTATED RINGERS INJ 1000 ML - (14EA/CA 60CA/PF)

## (undated) DEVICE — DRAPE VAGINAL BIB W/ POUCH (10EA/CA)

## (undated) DEVICE — DETERGENT RENUZYME PLUS 10 OZ PACKET (50/BX)

## (undated) DEVICE — TROCAR STEP 11MM - (3/CA)

## (undated) DEVICE — TUBE E-T HI-LO CUFF 7.0MM (10EA/PK)

## (undated) DEVICE — SUTURE 4-0 VICRYL PLUS FS-2 - 27 INCH (36/BX)

## (undated) DEVICE — NEPTUNE 4 PORT MANIFOLD - (20/PK)

## (undated) DEVICE — SENSOR SPO2 NEO LNCS ADHESIVE (20/BX) SEE USER NOTES

## (undated) DEVICE — SET EXTENSION WITH 2 PORTS (48EA/CA) ***PART #2C8610 IS A SUBSTITUTE*****

## (undated) DEVICE — HEAD HOLDER JUNIOR/ADULT

## (undated) DEVICE — SUTURE GENERAL

## (undated) DEVICE — SET LEADWIRE 5 LEAD BEDSIDE DISPOSABLE ECG (1SET OF 5/EA)

## (undated) DEVICE — ARMREST CRADLE FOAM - (2PR/PK 12PR/CA)

## (undated) DEVICE — SUTURE 0 VICRYL PLUS CT-1 - 8 X 18 INCH (12/BX)

## (undated) DEVICE — GLOVE BIOGEL PI ORTHO SZ 8 PF LF (40PR/BX)

## (undated) DEVICE — TUBING INFLOW HYSTEROSCOPY (10EA/CA)

## (undated) DEVICE — KIT ROOM DECONTAMINATION

## (undated) DEVICE — BLADE SURGICAL CLIPPER - (50EA/CA)

## (undated) DEVICE — CANISTER SUCTION RIGID RED 1500CC (40EA/CA)

## (undated) DEVICE — TRAY SRGPRP PVP IOD WT PRP - (20/CA)

## (undated) DEVICE — SODIUM CHL IRRIGATION 0.9% 1000ML (12EA/CA)

## (undated) DEVICE — TROCAR STEP 5MM - (3/CA)

## (undated) DEVICE — BLADE SURGICAL #15 - (50/BX 3BX/CA)

## (undated) DEVICE — CANISTER SUCTION 3000ML MECHANICAL FILTER AUTO SHUTOFF MEDI-VAC NONSTERILE LF DISP  (40EA/CA)

## (undated) DEVICE — KIT  I.V. START (100EA/CA)

## (undated) DEVICE — TUBING CLEARLINK DUO-VENT - C-FLO (48EA/CA)

## (undated) DEVICE — SUTURE 2-0 VICRYL PLUS CT-1 36 (36PK/BX)"

## (undated) DEVICE — SOLUTION SORBITOL 3000ML (4/CA)

## (undated) DEVICE — DRAPE MAYO STAND - (30/CA)

## (undated) DEVICE — TUBING OUTFLOW HYSTEROSCPY (10EA/BX)

## (undated) DEVICE — Device

## (undated) DEVICE — ELECTRODE 850 FOAM ADHESIVE - HYDROGEL RADIOTRNSPRNT (50/PK)

## (undated) DEVICE — KIT SURGIFLO W/OUT THROMBIN - (6EA/CA)

## (undated) DEVICE — JELLY SURGILUBE STERILE TUBE 4.25 OZ (1/EA)

## (undated) DEVICE — DEVICE SUTURE CAPTURING

## (undated) DEVICE — APPLICATOR SURGIFLO - (6EA/CA)

## (undated) DEVICE — PACK MINOR BASIN - (2EA/CA)

## (undated) DEVICE — NEEDLE INSUFFLATION FOR STEP - (12/BX)

## (undated) DEVICE — SUTURE CAPIO (12EA/BX)

## (undated) DEVICE — LIGASURE LAPAROSCOPIC 5MM - (6EA/CA)

## (undated) DEVICE — BANDAID X-LARGE 2 X 4 IN LF (50EA/BX)

## (undated) DEVICE — GLOVE BIOGEL SZ 6.5 SURGICAL PF LTX (50PR/BX 4BX/CA)

## (undated) DEVICE — HEMOSTAT ARISTA PWD 5 GRAM - (5/BX)

## (undated) DEVICE — WATER IRRIGATION STERILE 1000ML (12EA/CA)

## (undated) DEVICE — TRAY FOLEY CATHETER STATLOCK 16FR SURESTEP  (10EA/CA)

## (undated) DEVICE — PROTECTOR ULNA NERVE - (36PR/CA)

## (undated) DEVICE — ELECTRODE DUAL RETURN W/ CORD - (50/PK)

## (undated) DEVICE — GLOVESZ 8.5 BIOGEL PI MICRO - PF LF (50PR/BX)

## (undated) DEVICE — GOWN WARMING STANDARD FLEX - (30/CA)

## (undated) DEVICE — PAD SANITARY 11IN MAXI IND WRAPPED  (12EA/PK 24PK/CA)

## (undated) DEVICE — GLOVE SZ 6 BIOGEL PI MICRO - PF LF (50PR/BX 4BX/CA)

## (undated) DEVICE — SET SUCTION/IRRIGATION WITH DISPOSABLE TIP (6/CA )PART #0250-070-520 IS A SUB

## (undated) DEVICE — GLOVE BIOGEL SZ 8 SURGICAL PF LTX - (50PR/BX 4BX/CA)

## (undated) DEVICE — BANDAID SHEER STRIP 3/4 IN (100EA/BX 12BX/CA)

## (undated) DEVICE — DRAPE UNDER BUTTOCKS FLUID - (20/CA)

## (undated) DEVICE — MASK ANESTHESIA ADULT  - (100/CA)

## (undated) DEVICE — CATHETER IV 20 GA X 1-1/4 ---SURG.& SDS ONLY--- (50EA/BX)

## (undated) DEVICE — GLOVE SZ 7 BIOGEL PI MICRO - PF LF (50PR/BX 4BX/CA)

## (undated) DEVICE — SUCTION INSTRUMENT YANKAUER BULBOUS TIP W/O VENT (50EA/CA)

## (undated) DEVICE — CHLORAPREP 26 ML APPLICATOR - ORANGE TINT(25/CA)

## (undated) DEVICE — KIT ANESTHESIA W/CIRCUIT & 3/LT BAG W/FILTER (20EA/CA)

## (undated) DEVICE — BLADE SURGICAL #11 - (50/BX)

## (undated) DEVICE — PACK LAPAROSCOPY - (1/CA)

## (undated) DEVICE — TUBE CONNECTING SUCTION - CLEAR PLASTIC STERILE 72 IN (50EA/CA)

## (undated) DEVICE — SLEEVE, VASO, THIGH, MED